# Patient Record
Sex: FEMALE | Race: WHITE | NOT HISPANIC OR LATINO | Employment: OTHER | URBAN - METROPOLITAN AREA
[De-identification: names, ages, dates, MRNs, and addresses within clinical notes are randomized per-mention and may not be internally consistent; named-entity substitution may affect disease eponyms.]

---

## 2017-01-17 ENCOUNTER — HOSPITAL ENCOUNTER (EMERGENCY)
Facility: HOSPITAL | Age: 82
End: 2017-01-17
Attending: EMERGENCY MEDICINE
Payer: MEDICARE

## 2017-01-17 VITALS
RESPIRATION RATE: 16 BRPM | WEIGHT: 112 LBS | TEMPERATURE: 98 F | HEART RATE: 81 BPM | HEIGHT: 61 IN | SYSTOLIC BLOOD PRESSURE: 120 MMHG | BODY MASS INDEX: 21.14 KG/M2 | DIASTOLIC BLOOD PRESSURE: 82 MMHG | OXYGEN SATURATION: 95 %

## 2017-01-17 DIAGNOSIS — M25.551 RIGHT HIP PAIN: ICD-10-CM

## 2017-01-17 PROCEDURE — 25000003 PHARM REV CODE 250: Performed by: EMERGENCY MEDICINE

## 2017-01-17 PROCEDURE — 99284 EMERGENCY DEPT VISIT MOD MDM: CPT

## 2017-01-17 RX ORDER — ACETAMINOPHEN 325 MG/1
650 TABLET ORAL
Status: COMPLETED | OUTPATIENT
Start: 2017-01-17 | End: 2017-01-17

## 2017-01-17 RX ORDER — ESCITALOPRAM OXALATE 10 MG/1
10 TABLET ORAL DAILY
COMMUNITY
End: 2018-02-07 | Stop reason: SDUPTHER

## 2017-01-17 RX ORDER — NITROFURANTOIN MACROCRYSTALS 50 MG/1
50 CAPSULE ORAL NIGHTLY
COMMUNITY
End: 2018-02-07 | Stop reason: SDUPTHER

## 2017-01-17 RX ORDER — DOCUSATE SODIUM 100 MG/1
100 CAPSULE, LIQUID FILLED ORAL 2 TIMES DAILY
COMMUNITY
End: 2018-02-07 | Stop reason: SDUPTHER

## 2017-01-17 RX ORDER — CALCITRIOL 0.25 UG/1
0.25 CAPSULE ORAL DAILY
COMMUNITY
End: 2018-02-07 | Stop reason: SDUPTHER

## 2017-01-17 RX ORDER — OMEPRAZOLE 40 MG/1
40 CAPSULE, DELAYED RELEASE ORAL DAILY
COMMUNITY
End: 2018-02-07 | Stop reason: SDUPTHER

## 2017-01-17 RX ADMIN — ACETAMINOPHEN 650 MG: 325 TABLET, FILM COATED ORAL at 02:01

## 2017-01-17 NOTE — ED NOTES
Per MRI technician, skinny Richard is unable to have MRI of leg due to recent metal placement in neck. Dr. Mclean notified.

## 2017-01-17 NOTE — ED PROVIDER NOTES
Encounter Date: 1/17/2017    SCRIBE #1 NOTE: Sandra GARCIA, ronald scribing for, and in the presence of, Dr. Mclean.       History   No chief complaint on file.    Review of patient's allergies indicates:   Allergen Reactions    Codeine Hives    Penicillins Hives     HPI Comments: 1/17/2017  1:57 PM     Chief Complaint: Fall    The patient is a 82 y.o. female with PMHx of HTN, HLD, stroke, PVD, shingles, lumbar radiculopathy who presents with mechanical fall a few minutes pta. The patient had an witnessed fall at the nursing home while trying to get out of bed at 6 AM. Unknown LOC. Patient denies any headache or changes in neck pain. She c/o sudden onset of sharp pain to the anterior and lateral right hip that has been constant since the fall. Pt does mentions she had pain to the right hip posteriorly prior to the fall and was able to ambulate. However, the patient is not able to move her hip or ambulate. The nursing home was going to do XR at the nursing home but family insisted that she be evaluated at the ED. No other pain or injuries. Patient is at her normal mental baseline. Shx of hysterectomy, appendectomy, bladder suspension, anterior cervical discectomy with fusion.    The history is provided by the patient, the EMS personnel and the nursing home.     Past Medical History   Diagnosis Date    Hearing loss, conductive, bilateral     High cholesterol     History of shingles      vaginal    Hypertension     Kidney artery constriction      has renal artery stent    Lumbar radiculopathy     PONV (postoperative nausea and vomiting)     PVD (peripheral vascular disease)     Stroke      of eye vasculature     No past medical history pertinent negatives.  Past Surgical History   Procedure Laterality Date    Hysterectomy      Appendectomy      Bladder suspension      Anterior cervical discectomy w/ fusion       No family history on file.  Social History   Substance Use Topics    Smoking status:  Former Smoker    Smokeless tobacco: Not on file      Comment: quit 30 yrs ago    Alcohol use No     Review of Systems   Constitutional: Negative for appetite change, chills and fever.        + Fall   HENT: Negative for congestion, rhinorrhea and sore throat.    Respiratory: Negative for cough and shortness of breath.    Cardiovascular: Negative for chest pain.   Gastrointestinal: Negative for abdominal pain, diarrhea, nausea and vomiting.   Genitourinary: Negative for dysuria.   Musculoskeletal: Positive for arthralgias (right hip). Negative for back pain, myalgias and neck pain.   Skin: Negative for rash.   Neurological: Positive for syncope (unknown). Negative for weakness, numbness and headaches.   Hematological: Does not bruise/bleed easily.   All other systems reviewed and are negative.      Physical Exam   Initial Vitals   BP Pulse Resp Temp SpO2   -- -- -- -- --            Physical Exam    Nursing note and vitals reviewed.  Constitutional: No distress.   HENT:   Head: Normocephalic and atraumatic. Head is without abrasion, without contusion and without laceration.   Right Ear: External ear normal. No hemotympanum.   Left Ear: External ear normal. No hemotympanum.   Nose: Nose normal.   Mouth/Throat: Oropharynx is clear and moist and mucous membranes are normal.   Eyes: EOM are normal. Pupils are equal, round, and reactive to light.   Neck:   Soft collar in place to the neck.   Cardiovascular: Normal rate, regular rhythm, normal heart sounds, intact distal pulses and normal pulses. Exam reveals no gallop and no friction rub.    No murmur heard.  Pulses:       Dorsalis pedis pulses are 2+ on the right side, and 2+ on the left side.        Posterior tibial pulses are 2+ on the right side, and 2+ on the left side.   Pulmonary/Chest: Breath sounds normal. She has no wheezes. She has no rhonchi. She has no rales. She exhibits tenderness (left chest wall TTP). She exhibits no crepitus.   Musculoskeletal: She  exhibits tenderness (right lateral and anterior hip TTP). She exhibits no edema.   The patient is able to flex right lower extremity to 45 degrees but is limited secondary to pain. No other leg TTP.   Neurological: She is alert and oriented to person, place, and time.   5/5 strength and sensation to the upper extremities. Cranial nerves are intact.         ED Course   Procedures  Labs Reviewed - No data to display              Imaging Results         CT Lower Extremity Without Cont Right (Final result) Result time:  01/17/17 16:54:01    Final result by Sagar Muñoz MD (01/17/17 16:54:01)    Impression:     Soft tissue contusion along the lateral aspect of the right hip without evidence for fracture.      Electronically signed by: Sagar Muñoz MD  Date:     01/17/17  Time:    16:54     Narrative:    Axial images through the right hip were acquired without contrast with multiplanar reformatted images created.    No comparison    Findings: The bones are osteopenic.  No fracture is identified.  The right femoral head is located.  There is no right hip joint effusion.  Mild soft tissue contusion lateral to the right hip is present.  There is small enthesophyte formation along the greater trochanter.  Mild degenerative change of the right hip and pubic symphysis is noted.            X-Ray Hip 2 or 3 views Right (Final result) Result time:  01/17/17 14:38:41    Final result by Duong Leung Jr., MD (01/17/17 14:38:41)    Impression:     Osteoporosis without a fracture seen.  Atherosclerosis.      Electronically signed by: Duong Leung MD  Date:     01/17/17  Time:    14:38     Narrative:    AP and frog-leg of the right hip and AP of the pelvis is provided.  There is diffuse osteoporosis.  A fracture of the right femur is not seen.  Acetabulum is intact.  On this study fracture the bones of the pelvis is not seen.  There is extensive atherosclerotic calcification in the aorta, pelvic and femoral vessels.             CT Cervical Spine Without Contrast (Final result) Result time:  01/17/17 14:52:05    Final result by Sagar Muñoz MD (01/17/17 14:52:05)    Impression:        1.  No acute cervical spine injury.  2.  Postoperative and degenerative change of the cervical spine as above.      Electronically signed by: Sagar Muñoz MD  Date:     01/17/17  Time:    14:52     Narrative:    CT CERVICAL SPINE WITHOUT CONTRAST    Comparison: none    Technique:  Helically acquired axial images through the cervical spine were acquired without contrast.  Multiplanar reformatted images were provided.    FINDINGS:  There is no fracture or subluxation.  There has been prior anterior cervical discectomy and fusion at the C4-5 level.  There is osseous vertebral body fusion at C5-6 and C6-7.  No malalignment.  Moderate loss of disc height is present at C7-T1.  There is moderate hypertrophic change about the atlantoaxial articulation.    C3-4: Moderate left neuroforaminal narrowing   C4-5: Mild bilateral neuroforaminal narrowing.  C5-6: Mild bilateral neuroforaminal narrowing.  C7-T1: Mild right neuroforaminal narrowing.    The thyroid gland is heterogeneous with multiple small nodules present.  Mild emphysematous changes are seen at the lung apices.            CT Head Without Contrast (Final result) Result time:  01/17/17 14:30:28    Final result by Sagar Muñoz MD (01/17/17 14:30:28)    Impression:         No acute intracranial process.   Moderate chronic ischemic microvascular change.            Electronically signed by: Sagar Muñoz MD  Date:     01/17/17  Time:    14:30     Narrative:    CT HEAD WITHOUT CONTRAST    Technique: 5 mm axial images were obtained from the vertex to the skullbase, without administration of contrast.  Multiplanar reformatted images were created.    Comparison: None.    FINDINGS:    There is no acute intracranial process.  There is no hemorrhage, contusion, or extra-axial collection.   No mass or mass effect.  The ventricles are normal in size given the degree of age-appropriate involutional change. There is moderate periventricular white matter hypoattenuation suggesting chronic ischemic microvascular change.  Heavy calcification of the intracranial ICAs is seen.    The calvarium is intact.            (radiology reading, R hip XR visualized by me)          Scribe Attestation:   Scribe #1: I performed the above scribed service and the documentation accurately describes the services I performed. I attest to the accuracy of the note.    Attending Attestation:           Physician Attestation for Scribe:  Physician Attestation Statement for Scribe #1: I, Dr. Mclean, reviewed documentation, as scribed by Sandra Jaeger in my presence, and it is both accurate and complete.         Jessica Naidu is a 82 y.o. female presenting with right hip pain status post fall.  Patient is unreliable with CT of the head and cervical spine performed showing no acute process.  No sign of disruption of recent cervical fixation.  Based on patient's neck hardware, she is unable to obtain MRI.  X-ray shows no sign of fracture or dislocation with follow-up CT as most advanced available imaging modality possible also showing no fracture or dislocation at the hip.  The patient is distally neurovascularly intact and she notably did have hip pain prior to fall and has excellent range of motion here on serial reassessment with inability to fully range hip to 90° in flexion without pain.  She is appropriate for discharge back to nursing facility rehabilitation as tolerated by current functional status.  Close orthopedic follow-up was .  Detailed return precautions reviewed.        ED Course     Clinical Impression:   The encounter diagnosis was Right hip pain.          Jeffery Mclean MD  01/17/17 1912

## 2017-01-17 NOTE — ED NOTES
[facility] given to Karina at [facility]. [facility] Was told that they will be here shortly. Pt and daughter notified.

## 2017-01-17 NOTE — ED AVS SNAPSHOT
[facility] [address] [place] [address]               Jessica Naidu   2017  1:55 PM   ED    Description:  Female : 1934   Department:  [facility]           Your Care was Coordinated By:     Provider Role From To    Jeffery Mclean MD Attending Provider 17 0830 --      Reason for Visit     Fall           Diagnoses this Visit        Comments    Right hip pain           ED Disposition     ED Disposition Condition Comment    Discharge             To Do List           Follow-up Information     Follow up with Marcos Miller MD.    Specialties:  Sports Medicine, Orthopedic Surgery    Why:  Orthopedics, 3-5 days    Contact information:    [address] [place] [address] [address] [address]  [phone]        [facility] On [facility]     [facility] -  Assistance  Registered nurses in the [facility] provide clinical advisement, health education, appointment booking, and other advisory services.  Call for this free service at [phone].             Medications           Message regarding Medications     Verify the changes and/or additions to your medication regime listed below are the same as discussed with your clinician today.  If any of these changes or additions are incorrect, please notify your healthcare provider.        These medications were administered today        Dose Freq    acetaminophen tablet 650 mg 650 mg ED 1 Time    Sig: Take 2 tablets (650 mg total) by mouth ED 1 Time.    Class: Normal    Route: Oral      STOP taking these medications     aspirin (ECOTRIN) 81 MG EC tablet Take 81 mg by mouth once daily.      gabapentin (NEURONTIN) 300 MG capsule Take 300 mg by mouth every evening.      rosuvastatin (CRESTOR) 10 MG tablet Take 10 mg by mouth once daily.      spironolactone (ALDACTONE) 100 MG tablet Take 100 mg by mouth once daily.             Verify that the below list of medications  "is an accurate representation of the medications you are currently taking.  If none reported, the list may be blank. If incorrect, please contact your healthcare provider. Carry this list with you in case of emergency.           Current Medications     brimonidine 0.1% (ALPHAGAN P) 0.1 % Drop Place 1 drop into both eyes 3 (three) times daily.      calcitRIOL (ROCALTROL) 0.25 MCG Cap Take 0.25 mcg by mouth once daily.    cilostazol (PLETAL) 50 MG Tab Take 100 mg by mouth 2 (two) times daily.      diltiazem (CARDIZEM CD) 360 MG 24 hr capsule Take 360 mg by mouth once daily.     docusate sodium (COLACE) 100 MG capsule Take 100 mg by mouth 2 (two) times daily.    escitalopram oxalate (LEXAPRO) 10 MG tablet Take 10 mg by mouth once daily.    nitrofurantoin (MACRODANTIN) 50 MG capsule Take 50 mg by mouth every evening.    omeprazole (PRILOSEC) 40 MG capsule Take 40 mg by mouth once daily.           Clinical Reference Information           Your Vitals Were     BP Pulse Temp Resp Height Weight    131/70 82 98.4 °F (36.9 °C) (Oral) 18 5' 1" (1.549 m) 50.8 kg (112 lb)    SpO2 BMI             96% 21.16 kg/m2         Allergies as of 1/17/2017        Reactions    Codeine Hives    Penicillins Hives      Immunizations Administered on Date of Encounter - 1/17/2017     None      ED Micro, Lab, POCT     None      ED Imaging Orders     Start Ordered       Status Ordering Provider    01/17/17 1550 01/17/17 1549  CT Lower Extremity Without Cont Right  1 time imaging      Final result     01/17/17 1443 01/17/17 1443    1 time imaging,   Status:  Canceled      Canceled     01/17/17 1406 01/17/17 1406  CT Head Without Contrast  1 time imaging      Final result     01/17/17 1406 01/17/17 1406  CT Cervical Spine Without Contrast  1 time imaging      Final result     01/17/17 1406 01/17/17 1406  X-Ray Hip 2 or 3 views Right  1 time imaging      Final result       Discharge References/Attachments     HIP, HOW IT WORKS (ENGLISH)    ARTHRALGIA " (ENGLISH)      [facility] Sign-Up     Activating your [facility] account is as easy as 1-2-3!     1) Visit [email or web address], select Sign Up Now, enter this activation code and your date of birth, then select Next.  [email or web address]  Expires: 3/3/2017  5:19 PM      2) Create a username and password to use when you visit [facility] in the future and select a security question in case you lose your password and select Next.    3) Enter your e-mail address and click Sign Up!    Additional Information  If you have questions, please e-mail [email or web address] or call [phone] to talk to our [facility] staff. Remember, [facility] is NOT to be used for urgent needs. For medical emergencies, dial 911.         Smoking Cessation     If you would like to quit smoking:   You may be eligible for free services if you are a [place] resident and started smoking cigarettes before September 1, 1988.  Call the Smoking Cessation Trust (SCT) toll free at (819) 714-9221 or (863) 684-1843.   Call -549-QUIT-NOW if you do not meet the above criteria.             [facility] complies with applicable Federal civil rights laws and does not discriminate on the basis of race, color, national origin, age, disability, or sex.        Language Assistance Services     ATTENTION: Language assistance services are available, free of charge. Please call [phone].      ATENCIÓN: Si habla español, tiene a branch disposición servicios gratuitos de asistencia lingüística. Llame al [phone].     CHÚ Ý: N?u b?n nói Ti?ng Vi?t, có các d?ch v? h? tr? ngôn ng? mi?n phí dành cho b?n. G?i s? [phone].

## 2017-01-18 NOTE — ED NOTES
Pt presents to ED with c/o pain to right hip. Pt fell at 0645 this morning. Pt denies LOC. Pt reports that she hit her left breast and right hip. Bruising noted to left breast. Pt is AAOx4. Skin warm, dry to touch. Respirations even, nonlabored. NAD noted. VSS. Pt is calm, cooperative. Pt has bandage to right hip from hip surgery one week ago. Pt reports that she had a piece of bone removed from her right hip and placed in her neck. Pt has soft c-collor in place at this time. Pt denies new back or neck pain. Pt has full ROM of upper extremities and left lower extremity. Limited ROM noted to RLE due to pain.

## 2017-01-19 ENCOUNTER — TELEPHONE (OUTPATIENT)
Dept: ORTHOPEDICS | Facility: CLINIC | Age: 82
End: 2017-01-19

## 2017-01-19 NOTE — TELEPHONE ENCOUNTER
----- Message from Kayley Burrell LPN sent at 1/19/2017  1:43 PM CST -----  Contact: Darlene  Requesting appt with Tia on 1/25.  ----- Message -----     From: Karen Greene     Sent: 1/19/2017   1:07 PM       To: Paul Hernandez Staff    [facility] Dr. Miller. Stating ER f/u in 2/3 days. No availabilities until 1/30 and wanted to make sure was ok to schedule with Dr. Weber for 1/25. Please contact Darlene [phone]

## 2017-01-19 NOTE — TELEPHONE ENCOUNTER
Attempted to contact [facility] - phone was answered and  was going to speak with family if ok to schedule with other provider. Phone was picked back up then disconnected.

## 2018-02-07 DIAGNOSIS — K21.9 GASTROESOPHAGEAL REFLUX DISEASE, ESOPHAGITIS PRESENCE NOT SPECIFIED: Primary | ICD-10-CM

## 2018-02-07 DIAGNOSIS — F41.9 ANXIETY: ICD-10-CM

## 2018-02-07 RX ORDER — CILOSTAZOL 50 MG/1
100 TABLET ORAL 2 TIMES DAILY
Qty: 360 TABLET | Refills: 1 | Status: SHIPPED | OUTPATIENT
Start: 2018-02-07 | End: 2018-11-26 | Stop reason: SDUPTHER

## 2018-02-07 RX ORDER — CALCITRIOL 0.25 UG/1
0.25 CAPSULE ORAL DAILY
Qty: 90 CAPSULE | Refills: 1 | Status: SHIPPED | OUTPATIENT
Start: 2018-02-07 | End: 2018-11-26 | Stop reason: SDUPTHER

## 2018-02-07 RX ORDER — NITROFURANTOIN MACROCRYSTALS 50 MG/1
50 CAPSULE ORAL NIGHTLY
Qty: 90 CAPSULE | Refills: 1 | Status: SHIPPED | OUTPATIENT
Start: 2018-02-07 | End: 2018-11-26

## 2018-02-07 RX ORDER — DILTIAZEM HYDROCHLORIDE 360 MG/1
360 CAPSULE, EXTENDED RELEASE ORAL DAILY
Qty: 90 CAPSULE | Refills: 1 | Status: SHIPPED | OUTPATIENT
Start: 2018-02-07 | End: 2018-11-26 | Stop reason: SDUPTHER

## 2018-02-07 RX ORDER — ESCITALOPRAM OXALATE 10 MG/1
10 TABLET ORAL DAILY
Qty: 90 TABLET | Refills: 1 | Status: SHIPPED | OUTPATIENT
Start: 2018-02-07 | End: 2018-11-26

## 2018-02-07 RX ORDER — DOCUSATE SODIUM 100 MG/1
100 CAPSULE, LIQUID FILLED ORAL 2 TIMES DAILY
COMMUNITY
Start: 2018-02-07 | End: 2018-11-26

## 2018-02-07 RX ORDER — OMEPRAZOLE 40 MG/1
40 CAPSULE, DELAYED RELEASE ORAL DAILY
Qty: 90 CAPSULE | Refills: 1 | Status: SHIPPED | OUTPATIENT
Start: 2018-02-07 | End: 2018-11-26 | Stop reason: SDUPTHER

## 2018-02-07 NOTE — TELEPHONE ENCOUNTER
----- Message from Cassy Bolaños sent at 2/7/2018  9:42 AM CST -----  Contact: self  Pharmacy has been sending requests for refill of her meds.  Please send refills for all her meds to [facility].

## 2018-03-28 ENCOUNTER — TELEPHONE (OUTPATIENT)
Dept: INTERNAL MEDICINE | Facility: CLINIC | Age: 83
End: 2018-03-28

## 2018-03-28 RX ORDER — MEMANTINE HYDROCHLORIDE 10 MG/1
10 TABLET ORAL 2 TIMES DAILY
Qty: 90 TABLET | Refills: 1 | Status: SHIPPED | OUTPATIENT
Start: 2018-03-28 | End: 2018-11-26 | Stop reason: SDUPTHER

## 2018-03-28 NOTE — TELEPHONE ENCOUNTER
Not-Taking   Memantine HCl 10 MG Tablet 1 tablet Orally Twice a day      From patient's last visit summary.  Please advise.

## 2018-03-28 NOTE — TELEPHONE ENCOUNTER
----- Message from Cassy Bolaños sent at 3/27/2018  9:06 AM CDT -----  Contact: daughter-radha Madison is out of [place].  Please escribe to [facility].

## 2018-05-01 ENCOUNTER — TELEPHONE (OUTPATIENT)
Dept: INTERNAL MEDICINE | Facility: CLINIC | Age: 83
End: 2018-05-01

## 2018-05-01 NOTE — TELEPHONE ENCOUNTER
Dr. Calzada, It looks like she has been on Macrodantin (Nitrofurantoin)  Fairly continuously since 2014.  Can you please advise for the prior authorization?

## 2018-05-01 NOTE — TELEPHONE ENCOUNTER
Marcia Calzada MD   You 35 minutes ago (4:51 PM)      Yes for chronic uti prevention (Routing comment)        The following medication needs a prior authorization:     Medication Name: macrodantin     Dosage: 50mg    Frequency: qhs    Directions for use: take 1 at bedtime    Diagnosis: N39.0    Is the request for a reauthorization? Yes    Is the patient currently stable on therapy? Yes    Please list all therapeutic alternatives previously used with start/end dates and outcome:    Surgeries for bladder lifts    Multiple lincomycin antibiotic injections in-office to treat UTI's  Ciprofloxacin (multiple rounds)  Doxycycline (multiple rounds)  Allergic to Bactrim (sulfa class antibiotics)  Allergic to Penicillin class antibiotics    Thank you.

## 2018-05-01 NOTE — TELEPHONE ENCOUNTER
----- Message from Charissa Storm sent at 5/1/2018  8:58 AM CDT -----  PRIOR AUTHORIZATION, 04/30/2018

## 2018-05-03 ENCOUNTER — TELEPHONE (OUTPATIENT)
Dept: INTERNAL MEDICINE | Facility: CLINIC | Age: 83
End: 2018-05-03
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
## 2018-05-17 ENCOUNTER — TELEPHONE (OUTPATIENT)
Dept: INTERNAL MEDICINE | Facility: CLINIC | Age: 83
End: 2018-05-17
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
## 2018-05-17 NOTE — TELEPHONE ENCOUNTER
WE TOLD THE PATIENTS DAUGHTER SINCE SHE NO LONGER LIVES HERE THAT SHE NEEDED TO FIND A NEW PCP. WE NO LONGER SEE HER

## 2018-11-20 ENCOUNTER — OFFICE VISIT (OUTPATIENT)
Dept: UROGYNECOLOGY | Facility: CLINIC | Age: 83
End: 2018-11-20
Payer: MEDICARE

## 2018-11-20 VITALS
DIASTOLIC BLOOD PRESSURE: 75 MMHG | SYSTOLIC BLOOD PRESSURE: 155 MMHG | WEIGHT: 110 LBS | BODY MASS INDEX: 20.24 KG/M2 | HEIGHT: 62 IN | HEART RATE: 78 BPM

## 2018-11-20 DIAGNOSIS — N39.42 URINARY INCONTINENCE WITHOUT SENSORY AWARENESS: ICD-10-CM

## 2018-11-20 DIAGNOSIS — N39.0 RECURRENT UTI: ICD-10-CM

## 2018-11-20 DIAGNOSIS — R35.0 URINARY FREQUENCY: Primary | ICD-10-CM

## 2018-11-20 LAB
BILIRUB SERPL-MCNC: ABNORMAL MG/DL
BLOOD URINE, POC: ABNORMAL
COLOR, POC UA: ABNORMAL
GLUCOSE UR QL STRIP: ABNORMAL
KETONES UR QL STRIP: ABNORMAL
LEUKOCYTE ESTERASE URINE, POC: ABNORMAL
NITRITE, POC UA: POSITIVE
PH, POC UA: 6
PROTEIN, POC: ABNORMAL
SPECIFIC GRAVITY, POC UA: 1.01
UROBILINOGEN, POC UA: ABNORMAL

## 2018-11-20 PROCEDURE — 81002 URINALYSIS NONAUTO W/O SCOPE: CPT | Mod: S$GLB,,, | Performed by: OBSTETRICS & GYNECOLOGY

## 2018-11-20 PROCEDURE — 87086 URINE CULTURE/COLONY COUNT: CPT

## 2018-11-20 PROCEDURE — 87088 URINE BACTERIA CULTURE: CPT

## 2018-11-20 PROCEDURE — 1101F PT FALLS ASSESS-DOCD LE1/YR: CPT | Mod: CPTII,S$GLB,, | Performed by: OBSTETRICS & GYNECOLOGY

## 2018-11-20 PROCEDURE — 87077 CULTURE AEROBIC IDENTIFY: CPT

## 2018-11-20 PROCEDURE — 99203 OFFICE O/P NEW LOW 30 MIN: CPT | Mod: 25,S$GLB,, | Performed by: OBSTETRICS & GYNECOLOGY

## 2018-11-20 PROCEDURE — 87186 SC STD MICRODIL/AGAR DIL: CPT

## 2018-11-20 PROCEDURE — 99999 PR PBB SHADOW E&M-EST. PATIENT-LVL III: CPT | Mod: PBBFAC,,, | Performed by: OBSTETRICS & GYNECOLOGY

## 2018-11-20 RX ORDER — TRIMETHOPRIM 100 MG/1
100 TABLET ORAL NIGHTLY
Qty: 42 TABLET | Refills: 6 | Status: SHIPPED | OUTPATIENT
Start: 2018-11-20 | End: 2019-07-31 | Stop reason: SDUPTHER

## 2018-11-20 NOTE — PROGRESS NOTES
Subjective:     Chief Complaint:  Leakage of urine  History of Present Illness: Jessica Naidu is a 84 y.o. female who presents for insensitive incontinence.  She denies any symptoms but her daughter says when she walks down the rocha urine dribbles.  Patient denies any dysuria but her urinalysis today is normal.  She has been on Macrobid for prophylaxis.  She does have some decreased renal function.  Her hearing is very poor so communication is difficult in her daughter had to answer most of the questions.  She does have IBS and has had intermittent bouts of diarrhea and constipation and had a recent fecal impaction    Review of Systems    Constitutional: No acute distress. No weight gain/loss.  Eyes: No vision changes.  ENT:  Hearing aids but they do not work very well  Respiratory: No SOB.  Cardiovascular: PVD  Gastrointestinal:  fecal incontinence.   Genitourinary: No vaginal bleeding or discharge.  Integument/Breast:  Shingles  Hematologic/Lymphatic: No history of anemia.  Musculoskeletal: OA  Neurological:  Stroke  Behavioral/Psych: No history of depression.   Endocrine: No hormonal replacement.  Allergy/Immune: no recent reactions     Objective:   General Exam:  General appearance: WDNF. NAD.   HEENT:  Decreased auditory acuity  Neck: Normal thyroid.   Back: No CVA tenderness.  RESP: No SOB.  Breasts: deferred  Abdomen: Benign without localizing signs.  Extremities: No edema. No varices.  Lymphatic: noncontributory  Skin: No rashes. No lesions.  Neurologic: Intact.   Psych: Oriented.   Pelvic Exam:  V:  No lesions. No palpable nodes.   Va:No d/c bleeding or lesions.  Atrophy.  Good apical support and length.  Minimal apical anterior weakness  .Meatus:No caruncle or stenosis  Urethra: Non tender. No suburethral masses.  Some loss of urethrovesical junction support  Cx/Cuff: Normal   Uterus: Surgically absent.  Ad: No mass or tenderness.  Levators :Symmetrical. Normal tone. Non tender.  BL: Non tender  RV:   Much stool in rectum  Assessment:   OAB versus ISD-patient unable to give us an accurate history of her symptoms   Recurrent UTIs, symptomatic lead doing well with prophylaxis but her urinalysis today is abnormal    Plan:    Trial of Myrbetriq-if she does well we will prescribe it if not, I discussed with the daughter possibility of a bulking injection for ISD but I doubt will do that since the patient is not bothered by the leakage  Urine culture  Change prophylaxis to trimethoprim

## 2018-11-24 LAB — BACTERIA UR CULT: NORMAL

## 2018-11-26 ENCOUNTER — OFFICE VISIT (OUTPATIENT)
Dept: FAMILY MEDICINE | Facility: CLINIC | Age: 83
End: 2018-11-26
Payer: MEDICARE

## 2018-11-26 VITALS
OXYGEN SATURATION: 97 % | WEIGHT: 112 LBS | SYSTOLIC BLOOD PRESSURE: 126 MMHG | RESPIRATION RATE: 14 BRPM | HEART RATE: 72 BPM | DIASTOLIC BLOOD PRESSURE: 78 MMHG | HEIGHT: 62 IN | TEMPERATURE: 98 F | BODY MASS INDEX: 20.61 KG/M2

## 2018-11-26 DIAGNOSIS — M81.0 AGE-RELATED OSTEOPOROSIS WITHOUT CURRENT PATHOLOGICAL FRACTURE: ICD-10-CM

## 2018-11-26 DIAGNOSIS — R41.3 MEMORY LOSS: ICD-10-CM

## 2018-11-26 DIAGNOSIS — K21.9 GASTROESOPHAGEAL REFLUX DISEASE, ESOPHAGITIS PRESENCE NOT SPECIFIED: Primary | ICD-10-CM

## 2018-11-26 DIAGNOSIS — I10 ESSENTIAL HYPERTENSION: ICD-10-CM

## 2018-11-26 DIAGNOSIS — E78.00 PURE HYPERCHOLESTEROLEMIA: ICD-10-CM

## 2018-11-26 DIAGNOSIS — F90.2 ATTENTION DEFICIT HYPERACTIVITY DISORDER (ADHD), COMBINED TYPE: ICD-10-CM

## 2018-11-26 DIAGNOSIS — I70.1 RAS (RENAL ARTERY STENOSIS): ICD-10-CM

## 2018-11-26 PROBLEM — M25.511 CHRONIC PAIN OF BOTH SHOULDERS: Status: ACTIVE | Noted: 2018-11-26

## 2018-11-26 PROBLEM — M50.30 DEGENERATIVE DISC DISEASE, CERVICAL: Status: ACTIVE | Noted: 2018-11-26

## 2018-11-26 PROBLEM — M25.552 PAIN OF BOTH HIP JOINTS: Status: ACTIVE | Noted: 2018-11-26

## 2018-11-26 PROBLEM — M25.551 PAIN OF BOTH HIP JOINTS: Status: ACTIVE | Noted: 2018-11-26

## 2018-11-26 PROBLEM — M51.36 DEGENERATION OF INTERVERTEBRAL DISC OF LUMBAR REGION: Status: ACTIVE | Noted: 2018-11-26

## 2018-11-26 PROBLEM — M25.512 CHRONIC PAIN OF BOTH SHOULDERS: Status: ACTIVE | Noted: 2018-11-26

## 2018-11-26 PROBLEM — G89.29 CHRONIC PAIN OF BOTH SHOULDERS: Status: ACTIVE | Noted: 2018-11-26

## 2018-11-26 PROCEDURE — 1101F PT FALLS ASSESS-DOCD LE1/YR: CPT | Mod: ,,, | Performed by: INTERNAL MEDICINE

## 2018-11-26 PROCEDURE — 99214 OFFICE O/P EST MOD 30 MIN: CPT | Mod: ,,, | Performed by: INTERNAL MEDICINE

## 2018-11-26 RX ORDER — DILTIAZEM HYDROCHLORIDE 360 MG/1
360 CAPSULE, EXTENDED RELEASE ORAL DAILY
Qty: 90 CAPSULE | Refills: 1 | Status: SHIPPED | OUTPATIENT
Start: 2018-11-26 | End: 2019-04-22 | Stop reason: SDUPTHER

## 2018-11-26 RX ORDER — MEMANTINE HYDROCHLORIDE 10 MG/1
10 TABLET ORAL 2 TIMES DAILY
Qty: 90 TABLET | Refills: 1 | Status: SHIPPED | OUTPATIENT
Start: 2018-11-26 | End: 2019-04-30 | Stop reason: SDUPTHER

## 2018-11-26 RX ORDER — CIPROFLOXACIN 500 MG/1
500 TABLET ORAL 2 TIMES DAILY
Qty: 10 TABLET | Refills: 1 | Status: SHIPPED | OUTPATIENT
Start: 2018-11-26 | End: 2019-01-27

## 2018-11-26 RX ORDER — HYDROCODONE BITARTRATE AND ACETAMINOPHEN 7.5; 325 MG/15ML; MG/15ML
SOLUTION ORAL
Refills: 0 | COMMUNITY
Start: 2018-11-07 | End: 2019-04-30

## 2018-11-26 RX ORDER — ATORVASTATIN CALCIUM 10 MG/1
10 TABLET, FILM COATED ORAL DAILY
Qty: 90 TABLET | Refills: 1 | Status: SHIPPED | OUTPATIENT
Start: 2018-11-26 | End: 2019-04-30 | Stop reason: SDUPTHER

## 2018-11-26 RX ORDER — CILOSTAZOL 50 MG/1
100 TABLET ORAL 2 TIMES DAILY
Qty: 360 TABLET | Refills: 1 | Status: SHIPPED | OUTPATIENT
Start: 2018-11-26 | End: 2019-04-22 | Stop reason: SDUPTHER

## 2018-11-26 RX ORDER — DONEPEZIL HYDROCHLORIDE 10 MG/1
TABLET, FILM COATED ORAL
COMMUNITY
Start: 2018-11-12 | End: 2018-11-26 | Stop reason: SDUPTHER

## 2018-11-26 RX ORDER — ALENDRONATE SODIUM 70 MG/1
70 TABLET ORAL
Qty: 12 TABLET | Refills: 1 | Status: SHIPPED | OUTPATIENT
Start: 2018-11-26 | End: 2019-04-30 | Stop reason: SDUPTHER

## 2018-11-26 RX ORDER — CALCITRIOL 0.25 UG/1
0.25 CAPSULE ORAL DAILY
Qty: 90 CAPSULE | Refills: 1 | Status: SHIPPED | OUTPATIENT
Start: 2018-11-26 | End: 2019-04-22 | Stop reason: SDUPTHER

## 2018-11-26 RX ORDER — DONEPEZIL HYDROCHLORIDE 10 MG/1
10 TABLET, FILM COATED ORAL DAILY
Qty: 90 TABLET | Refills: 1 | Status: SHIPPED | OUTPATIENT
Start: 2018-11-26 | End: 2019-04-30 | Stop reason: SDUPTHER

## 2018-11-26 RX ORDER — ATOMOXETINE 10 MG/1
10 CAPSULE ORAL DAILY
Qty: 30 CAPSULE | Refills: 0 | Status: SHIPPED | OUTPATIENT
Start: 2018-11-26 | End: 2019-08-13

## 2018-11-26 RX ORDER — OMEPRAZOLE 40 MG/1
40 CAPSULE, DELAYED RELEASE ORAL DAILY
Qty: 90 CAPSULE | Refills: 1 | Status: SHIPPED | OUTPATIENT
Start: 2018-11-26 | End: 2019-04-30 | Stop reason: SDUPTHER

## 2018-11-26 RX ORDER — IPRATROPIUM BROMIDE 0.5 MG/2.5ML
SOLUTION RESPIRATORY (INHALATION)
COMMUNITY
End: 2019-04-30

## 2018-11-26 RX ORDER — ALENDRONATE SODIUM 70 MG/1
TABLET ORAL
COMMUNITY
Start: 2018-10-01 | End: 2018-11-26 | Stop reason: SDUPTHER

## 2018-11-26 RX ORDER — OFLOXACIN 3 MG/ML
SOLUTION/ DROPS OPHTHALMIC
COMMUNITY
Start: 2018-11-19 | End: 2020-01-04 | Stop reason: ALTCHOICE

## 2018-11-26 RX ORDER — HYDROCODONE BITARTRATE AND ACETAMINOPHEN 7.5; 325 MG/15ML; MG/15ML
SOLUTION ORAL
Qty: 118 ML | Refills: 0 | Status: CANCELLED | OUTPATIENT
Start: 2018-11-26

## 2018-11-26 RX ORDER — ATORVASTATIN CALCIUM 10 MG/1
TABLET, FILM COATED ORAL
COMMUNITY
Start: 2018-11-12 | End: 2018-11-26 | Stop reason: SDUPTHER

## 2018-11-26 NOTE — PROGRESS NOTES
SUBJECTIVE:    Patient ID: Jessica Naidu is a 84 y.o. female.    Chief Complaint: Hypertension and Follow-up    HPI     Pt is back to [id number] saw Neuro --dx Mild Alzheimers-Saw Dr Beltran and all doing well--she does not have much of an appetite-eats very little but more times a day-saw Dr Buckley for pain in the low back and shoulder (right)-also saw ortho and got MRI which revealed the need for a surgery but she is not considered a good surgical candidate--Her pain management is Ion Buckley.     Memory is a real issue-    Office Visit on 11/20/2018   Component Date Value Ref Range Status    Color, UA 11/20/2018 dark yellow   Final    Spec Grav UA 11/20/2018 1.015   Final    pH, UA 11/20/2018 6   Final    WBC, UA 11/20/2018 trace   Final    Nitrite, UA 11/20/2018 positive   Final    Protein 11/20/2018 1+   Final    Glucose, UA 11/20/2018 norm   Final    Ketones, UA 11/20/2018 neg   Final    Urobilinogen, UA 11/20/2018 norm   Final    Bilirubin 11/20/2018 neg   Final    Blood, UA 11/20/2018 trace   Final    Urine Culture, Routine 11/20/2018    Final                    Value:PSEUDOMONAS AERUGINOSA  >100,000 cfu/ml         Past Medical History:   Diagnosis Date    Hearing loss, conductive, bilateral     High cholesterol     History of shingles     vaginal    Hypertension     Kidney artery constriction     has renal artery stent    Lumbar radiculopathy     PONV (postoperative nausea and vomiting)     PVD (peripheral vascular disease)     Stroke     of eye vasculature     Social History     Socioeconomic History    Marital status: Single     Spouse name: Not on file    Number of children: Not on file    Years of education: Not on file    Highest education level: Not on file   Social Needs    Financial resource strain: Not on file    Food insecurity - worry: Not on file    Food insecurity - inability: Not on file    Transportation needs - medical: Not on file    Transportation  "needs - non-medical: Not on file   Occupational History    Not on file   Tobacco Use    Smoking status: Former Smoker     Packs/day: 1.00     Types: Cigarettes    Smokeless tobacco: Never Used    Tobacco comment: quit 30 yrs ago   Substance and Sexual Activity    Alcohol use: No    Drug use: No    Sexual activity: Not on file   Other Topics Concern    Not on file   Social History Narrative    Not on file     Past Surgical History:   Procedure Laterality Date    ANTERIOR CERVICAL DISCECTOMY W/ FUSION      APPENDECTOMY      BLADDER SUSPENSION      BLOCK, NERVE, FACET JOINT, LUMBAR, MEDIAL BRANCH Right 10/25/2012    [facility]    HYSTERECTOMY      RADIOFREQUENCY ABLATION, NERVE, SPINAL, LUMBAR, MEDIAL BRANCH, 1 LEVEL Right 11/8/2012    [facility]     History reviewed. No pertinent family history.  Vitals:    11/26/18 1539   BP: 126/78   Pulse: 72   Resp: 14   Temp: 97.9 °F (36.6 °C)   SpO2: 97%   Weight: 50.8 kg (112 lb)   Height: 5' 2" (1.575 m)       Review of Systems   Constitutional: Negative for appetite change, chills, diaphoresis, fatigue, fever and unexpected weight change.        Eats a lot of sugar-daughter is not impressed with the amount of food she eats   HENT: Negative for congestion, ear pain, hearing loss, nosebleeds, postnasal drip, sinus pressure, sinus pain, sneezing, sore throat, tinnitus, trouble swallowing and voice change.         Chronic rhinorrhea-occasionally she feel like food will not pass thru the esophagus several times a day-more like food gets clogged and she clears throat but cannot talk due to same   Eyes: Negative for photophobia, pain, itching and visual disturbance.   Respiratory: Negative for apnea, cough (chronic cough), chest tightness, shortness of breath, wheezing and stridor.         Some interstitial disease but she is not sx   Cardiovascular: Negative for chest pain, palpitations and leg " swelling.   Gastrointestinal: Negative for abdominal distention, abdominal pain, blood in stool, constipation, diarrhea, nausea and vomiting.   Endocrine: Negative for cold intolerance, heat intolerance, polydipsia and polyuria.   Genitourinary: Negative for difficulty urinating, dyspareunia, dysuria, flank pain, frequency (with some incontinence), hematuria, menstrual problem, pelvic pain, urgency, vaginal discharge and vaginal pain.   Musculoskeletal: Negative for arthralgias, back pain, joint swelling, myalgias, neck pain and neck stiffness.        HPI   Skin: Negative for pallor.        bruises easily   Allergic/Immunologic: Negative for environmental allergies and food allergies.   Neurological: Negative for dizziness, tremors, speech difficulty, weakness, light-headedness and numbness.        Hard of hearing-wears hearing aides   Hematological: Does not bruise/bleed easily (chronic).   Psychiatric/Behavioral: Negative for agitation, confusion (dementia beginning), decreased concentration (hyper all the time), sleep disturbance and suicidal ideas. The patient is not nervous/anxious.           Objective:      Physical Exam   Constitutional: She is oriented to person, place, and time. She appears well-developed and well-nourished. She is cooperative. No distress.   HENT:   Head: Normocephalic and atraumatic.   Right Ear: Tympanic membrane normal.   Left Ear: Tympanic membrane normal.   Mouth/Throat: Uvula is midline and mucous membranes are normal.   Clearing throat throughout the exam   Eyes: Conjunctivae and EOM are normal. Right pupil is round and reactive. Left pupil is round and reactive.   Neck: Trachea normal and normal range of motion. Neck supple. No JVD present. No thyromegaly present.   Cardiovascular: Normal rate, regular rhythm and intact distal pulses.   Murmur (2/6 BOLIVAR base) heard.  Pulmonary/Chest: Effort normal and breath sounds normal. No tachypnea. No respiratory distress.   Abdominal: Soft.  Bowel sounds are normal. There is no tenderness.   Musculoskeletal: Normal range of motion.   Crepitus  shoulders   Lymphadenopathy:     She has no cervical adenopathy.   Neurological: She is alert and oriented to person, place, and time. She has normal strength.   Short term memory loss   Skin: Skin is warm and dry. No rash noted.   Psychiatric: Her speech is normal.   Very hyperactive-   Nursing note and vitals reviewed.          Assessment:       1. Gastroesophageal reflux disease, esophagitis presence not specified    2. Memory loss    3. Attention deficit hyperactivity disorder (ADHD), combined type    4. Age-related osteoporosis without current pathological fracture    5. CHASTITY (renal artery stenosis)    6. Essential hypertension    7. Pure hypercholesterolemia         Plan:           Gastroesophageal reflux disease, esophagitis presence not specified  -     omeprazole (PRILOSEC) 40 MG capsule; Take 1 capsule (40 mg total) by mouth once daily.  Dispense: 90 capsule; Refill: 1  -     Comprehensive metabolic panel; Future; Expected date: 11/26/2018    Memory loss  -     memantine (NAMENDA) 10 MG Tab; Take 1 tablet (10 mg total) by mouth 2 (two) times daily.  Dispense: 90 tablet; Refill: 1  -     donepezil (ARICEPT) 10 MG tablet; Take 1 tablet (10 mg total) by mouth once daily.  Dispense: 90 tablet; Refill: 1  -     cilostazol (PLETAL) 50 MG Tab; Take 2 tablets (100 mg total) by mouth 2 (two) times daily.  Dispense: 360 tablet; Refill: 1    Attention deficit hyperactivity disorder (ADHD), combined type        -     Try very low dose of strattera 10 mg    Age-related osteoporosis without current pathological fracture  -     calcitRIOL (ROCALTROL) 0.25 MCG Cap; Take 1 capsule (0.25 mcg total) by mouth once daily.  Dispense: 90 capsule; Refill: 1  -     alendronate (FOSAMAX) 70 MG tablet; Take 1 tablet (70 mg total) by mouth every 7 days.  Dispense: 12 tablet; Refill: 1  -     Vitamin D; Future; Expected date:  11/26/2018    CHASTITY (renal artery stenosis)  -     cilostazol (PLETAL) 50 MG Tab; Take 2 tablets (100 mg total) by mouth 2 (two) times daily.  Dispense: 360 tablet; Refill: 1  -     Lipid panel; Future; Expected date: 11/26/2018    Essential hypertension  -     diltiaZEM (CARDIZEM CD) 360 MG 24 hr capsule; Take 1 capsule (360 mg total) by mouth once daily.  Dispense: 90 capsule; Refill: 1  -     cilostazol (PLETAL) 50 MG Tab; Take 2 tablets (100 mg total) by mouth 2 (two) times daily.  Dispense: 360 tablet; Refill: 1  -     CBC auto differential; Future; Expected date: 11/26/2018  -     Comprehensive metabolic panel; Future; Expected date: 11/26/2018  -     Lipid panel; Future; Expected date: 11/26/2018    Pure hypercholesterolemia  -     atorvastatin (LIPITOR) 10 MG tablet; Take 1 tablet (10 mg total) by mouth once daily.  Dispense: 90 tablet; Refill: 1  -     Comprehensive metabolic panel; Future; Expected date: 11/26/2018  -     Lipid panel; Future; Expected date: 11/26/2018    Other orders  -     Cancel: hydrocodone-acetaminophen (HYCET) solution 7.5-325 mg/15mL; TK 5 ML PO ONCE A DAY PRN  Dispense: 118 mL; Refill: 0  -     atomoxetine (STRATTERA) 10 MG capsule; Take 1 capsule (10 mg total) by mouth once daily.  Dispense: 30 capsule; Refill: 0

## 2018-12-29 DIAGNOSIS — G47.30 SLEEP APNEA, UNSPECIFIED TYPE: Primary | ICD-10-CM

## 2019-01-27 ENCOUNTER — TELEPHONE (OUTPATIENT)
Dept: FAMILY MEDICINE | Facility: CLINIC | Age: 84
End: 2019-01-27

## 2019-01-27 DIAGNOSIS — J40 BRONCHITIS: Primary | ICD-10-CM

## 2019-01-27 RX ORDER — BENZONATATE 100 MG/1
100 CAPSULE ORAL 3 TIMES DAILY PRN
Qty: 30 CAPSULE | Refills: 0 | Status: SHIPPED | OUTPATIENT
Start: 2019-01-27 | End: 2019-01-30

## 2019-01-27 RX ORDER — AZITHROMYCIN 200 MG/5ML
5 POWDER, FOR SUSPENSION ORAL DAILY
Qty: 30 ML | Refills: 0 | Status: SHIPPED | OUTPATIENT
Start: 2019-01-27 | End: 2019-02-01

## 2019-01-30 ENCOUNTER — OFFICE VISIT (OUTPATIENT)
Dept: GASTROENTEROLOGY | Facility: CLINIC | Age: 84
End: 2019-01-30
Payer: MEDICARE

## 2019-01-30 VITALS
HEART RATE: 80 BPM | WEIGHT: 112.38 LBS | SYSTOLIC BLOOD PRESSURE: 128 MMHG | RESPIRATION RATE: 16 BRPM | TEMPERATURE: 98 F | BODY MASS INDEX: 20.56 KG/M2 | DIASTOLIC BLOOD PRESSURE: 82 MMHG

## 2019-01-30 DIAGNOSIS — M81.0 AGE-RELATED OSTEOPOROSIS WITHOUT CURRENT PATHOLOGICAL FRACTURE: ICD-10-CM

## 2019-01-30 DIAGNOSIS — R19.7 DIARRHEA, UNSPECIFIED TYPE: ICD-10-CM

## 2019-01-30 DIAGNOSIS — I70.1 RAS (RENAL ARTERY STENOSIS): ICD-10-CM

## 2019-01-30 DIAGNOSIS — R05.9 COUGH: ICD-10-CM

## 2019-01-30 DIAGNOSIS — I10 ESSENTIAL HYPERTENSION: ICD-10-CM

## 2019-01-30 DIAGNOSIS — J06.9 UPPER RESPIRATORY TRACT INFECTION, UNSPECIFIED TYPE: ICD-10-CM

## 2019-01-30 DIAGNOSIS — K21.9 GASTROESOPHAGEAL REFLUX DISEASE, ESOPHAGITIS PRESENCE NOT SPECIFIED: Primary | ICD-10-CM

## 2019-01-30 PROCEDURE — 3074F PR MOST RECENT SYSTOLIC BLOOD PRESSURE < 130 MM HG: ICD-10-PCS | Mod: ,,, | Performed by: INTERNAL MEDICINE

## 2019-01-30 PROCEDURE — 1101F PT FALLS ASSESS-DOCD LE1/YR: CPT | Mod: ,,, | Performed by: INTERNAL MEDICINE

## 2019-01-30 PROCEDURE — 3079F DIAST BP 80-89 MM HG: CPT | Mod: ,,, | Performed by: INTERNAL MEDICINE

## 2019-01-30 PROCEDURE — 3074F SYST BP LT 130 MM HG: CPT | Mod: ,,, | Performed by: INTERNAL MEDICINE

## 2019-01-30 PROCEDURE — 99205 PR OFFICE/OUTPT VISIT, NEW, LEVL V, 60-74 MIN: ICD-10-PCS | Mod: ,,, | Performed by: INTERNAL MEDICINE

## 2019-01-30 PROCEDURE — 99205 OFFICE O/P NEW HI 60 MIN: CPT | Mod: ,,, | Performed by: INTERNAL MEDICINE

## 2019-01-30 PROCEDURE — 3079F PR MOST RECENT DIASTOLIC BLOOD PRESSURE 80-89 MM HG: ICD-10-PCS | Mod: ,,, | Performed by: INTERNAL MEDICINE

## 2019-01-30 PROCEDURE — 1101F PR PT FALLS ASSESS DOC 0-1 FALLS W/OUT INJ PAST YR: ICD-10-PCS | Mod: ,,, | Performed by: INTERNAL MEDICINE

## 2019-01-30 NOTE — PROGRESS NOTES
[facility] Established Patient Visit    Subjective:           PCP: Marcia Calzada    Chief Complaint: Uncontrollable bowels       HPI:  Jessica Naidu is a 84 y.o. female here for eructations, reflux, and has diarrhea. Patient has hx of arhtiris, cataracts, htn, kidney disease, osteoporosis, cosmetic surgery, eye surgery, and allergies. She has had a colonoscopy in the past. Mother and father had heart disease, htn, and kidney disease. Medications have been reviewed in detail. A detailed physical exam is noncontributory. The patient has respiratory symptoms and needs a chest x-ray.      ROS:   Constitutional: No fevers, chills, weight loss  ENT: No allergies, sore throat, rhinorrhea  CV: No chest pain, palpitations, edema  Pulm: No cough, shortness of breath, wheezing  Ophtho: No vision changes  GI: No blood in stools, change in bowel habits, nausea/vomiting  Denies alternating diarrhea/constipation.   Derm: No rash  Heme: No easy bruising or lymphadenopathy  MSK: No arthralgias or myalgias  : No dysuria, hematuria, frequency, polyuria, or flank tenderness  Endo: No hot or cold intolerance  Neuro: No syncope or seizure, or dizziness  Psych: No hallucination, depression or suicidal ideation      Medical History:  has a past medical history of Arthritis, Hearing loss, conductive, bilateral, High cholesterol, History of shingles, Hypertension, Kidney artery constriction, Lumbar radiculopathy, Osteoporosis, PONV (postoperative nausea and vomiting), PVD (peripheral vascular disease), and Stroke.      Surgical History:  has a past surgical history that includes Hysterectomy; Appendectomy; Bladder suspension; Anterior cervical discectomy w/ fusion; Colonoscopy; Eye surgery; Cosmetic surgery; and Cervical spine surgery.    Family History:   Family History   Problem Relation Age of Onset    Arthritis Mother     Hearing loss Mother     Heart disease Mother     Hypertension Mother     Kidney disease Mother      Stroke Mother     Heart disease Father        Social History:   Social History     Tobacco Use    Smoking status: Former Smoker     Packs/day: 1.00     Types: Cigarettes    Smokeless tobacco: Never Used    Tobacco comment: quit 30 yrs ago   Substance Use Topics    Alcohol use: No    Drug use: No       The patient's social and family histories were reviewed by me and updated in the appropriate section of the electronic medical record.    Allergies:   Review of patient's allergies indicates:   Allergen Reactions    Adhesive     Arb-angiotensin receptor antagonist     Atacand [candesartan]     Bactrim [sulfamethoxazole-trimethoprim]     Codeine Hives    Penicillins Hives    Trental [pentoxifylline]          Medications:   Current Outpatient Medications   Medication Sig Dispense Refill    alendronate (FOSAMAX) 70 MG tablet Take 1 tablet (70 mg total) by mouth every 7 days. 12 tablet 1    atorvastatin (LIPITOR) 10 MG tablet Take 1 tablet (10 mg total) by mouth once daily. 90 tablet 1    azithromycin 200 mg/5 ml (ZITHROMAX) 200 mg/5 mL suspension Take 6 mLs (240 mg total) by mouth once daily. for 5 days 30 mL 0    brimonidine 0.1% (ALPHAGAN P) 0.1 % Drop Place 1 drop into both eyes 3 (three) times daily.        calcitRIOL (ROCALTROL) 0.25 MCG Cap Take 1 capsule (0.25 mcg total) by mouth once daily. 90 capsule 1    cilostazol (PLETAL) 50 MG Tab Take 2 tablets (100 mg total) by mouth 2 (two) times daily. 360 tablet 1    diltiaZEM (CARDIZEM CD) 360 MG 24 hr capsule Take 1 capsule (360 mg total) by mouth once daily. 90 capsule 1    donepezil (ARICEPT) 10 MG tablet Take 1 tablet (10 mg total) by mouth once daily. 90 tablet 1    hydrocodone-acetaminophen (HYCET) solution 7.5-325 mg/15mL TK 5 ML PO ONCE A DAY PRN P  0    memantine (NAMENDA) 10 MG Tab Take 1 tablet (10 mg total) by mouth 2 (two) times daily. 90 tablet 1    ofloxacin (OCUFLOX) 0.3 % ophthalmic solution       omeprazole (PRILOSEC) 40  MG capsule Take 1 capsule (40 mg total) by mouth once daily. 90 capsule 1    trimethoprim (TRIMPEX) 100 mg Tab Take 1 tablet (100 mg total) by mouth every evening. 42 tablet 6    atomoxetine (STRATTERA) 10 MG capsule Take 1 capsule (10 mg total) by mouth once daily. 30 capsule 0    ipratropium (ATROVENT) 0.02 % nebulizer solution ipratropium bromide       No current facility-administered medications for this visit.      All medications and past history have been reviewed.        Objective:        Vital Signs:  Blood pressure 128/82, pulse 80, temperature 97.5 °F (36.4 °C), resp. rate 16, weight 51 kg (112 lb 6.4 oz). Body mass index is 20.56 kg/m².    Physical Exam:   General Appearance: Well appearing in no acute distress, well developed, well                nourished  Head: Normocephalic, without obvious abnormality  Eyes:  Pupils equal, round and reactive to light  Throat: Lips, mucosa, and tongue normal; teeth and gums normal  Lungs: CTA bilaterally in anterior and posterior fields, no wheezes, no crackles  Heart:  Regular rate and rhythm, no murmurs heard  Abdomen: Soft, non tender, non distended with positive bowel sounds in all four quadrants. No hepatosplenomegaly, ascites, or mass. Negative for succusion splash  : female   Extremities: No cyanosis, edema or deformity  Skin: No rash  Neurologic: Normal exam    Labs:  No results found for: WBC, HGB, HCT, PLT, CHOL, TRIG, HDL, LDLDIRECT, ALT, AST, NA, K, CL, CREATININE, BUN, CO2, TSH, PSA, INR, GLUF, HGBA1C, MICROALBUR    Imaging:     All lab results and imaging results have been reviewed and discussed with the patient      Assessment:       No diagnosis found.    1. GERD  2. Diarrhea  3. Upper respiratory tract infection  See visit diagnoses  Plan:       There are no diagnoses linked to this encounter.    See HPI    No Follow-up on file.    The plan was discussed with the patient and all questions/concerns have been answered to the patient's  satisfaction.        Electronically signed by Arabella Russ MD    This note was dictated using voice recognition software and may contain grammatical errors.

## 2019-02-06 ENCOUNTER — OFFICE VISIT (OUTPATIENT)
Dept: FAMILY MEDICINE | Facility: CLINIC | Age: 84
End: 2019-02-06
Payer: MEDICARE

## 2019-02-06 VITALS
WEIGHT: 108.19 LBS | HEART RATE: 84 BPM | OXYGEN SATURATION: 96 % | SYSTOLIC BLOOD PRESSURE: 138 MMHG | DIASTOLIC BLOOD PRESSURE: 70 MMHG | HEIGHT: 62 IN | BODY MASS INDEX: 19.91 KG/M2

## 2019-02-06 DIAGNOSIS — B37.2 YEAST DERMATITIS: ICD-10-CM

## 2019-02-06 DIAGNOSIS — J20.9 ACUTE BRONCHITIS, UNSPECIFIED ORGANISM: Primary | ICD-10-CM

## 2019-02-06 PROCEDURE — 99214 PR OFFICE/OUTPT VISIT, EST, LEVL IV, 30-39 MIN: ICD-10-PCS | Mod: ,,, | Performed by: NURSE PRACTITIONER

## 2019-02-06 PROCEDURE — 1101F PR PT FALLS ASSESS DOC 0-1 FALLS W/OUT INJ PAST YR: ICD-10-PCS | Mod: ,,, | Performed by: NURSE PRACTITIONER

## 2019-02-06 PROCEDURE — 3078F DIAST BP <80 MM HG: CPT | Mod: ,,, | Performed by: NURSE PRACTITIONER

## 2019-02-06 PROCEDURE — 1101F PT FALLS ASSESS-DOCD LE1/YR: CPT | Mod: ,,, | Performed by: NURSE PRACTITIONER

## 2019-02-06 PROCEDURE — 3078F PR MOST RECENT DIASTOLIC BLOOD PRESSURE < 80 MM HG: ICD-10-PCS | Mod: ,,, | Performed by: NURSE PRACTITIONER

## 2019-02-06 PROCEDURE — 3075F PR MOST RECENT SYSTOLIC BLOOD PRESS GE 130-139MM HG: ICD-10-PCS | Mod: ,,, | Performed by: NURSE PRACTITIONER

## 2019-02-06 PROCEDURE — 99214 OFFICE O/P EST MOD 30 MIN: CPT | Mod: ,,, | Performed by: NURSE PRACTITIONER

## 2019-02-06 PROCEDURE — 3075F SYST BP GE 130 - 139MM HG: CPT | Mod: ,,, | Performed by: NURSE PRACTITIONER

## 2019-02-06 RX ORDER — CEFDINIR 300 MG/1
300 CAPSULE ORAL 2 TIMES DAILY
Qty: 10 CAPSULE | Refills: 0 | Status: SHIPPED | OUTPATIENT
Start: 2019-02-06 | End: 2019-02-16

## 2019-02-06 RX ORDER — CLOTRIMAZOLE 1 %
CREAM (GRAM) TOPICAL 2 TIMES DAILY
Qty: 60 G | Refills: 1 | Status: SHIPPED | OUTPATIENT
Start: 2019-02-06 | End: 2019-04-30

## 2019-02-06 RX ORDER — GUAIFENESIN AND DEXTROMETHORPHAN HYDROBROMIDE 1200; 60 MG/1; MG/1
1 TABLET, EXTENDED RELEASE ORAL 2 TIMES DAILY
Qty: 30 TABLET | Refills: 1 | Status: SHIPPED | OUTPATIENT
Start: 2019-02-06 | End: 2019-02-16

## 2019-02-06 RX ORDER — ALBUTEROL SULFATE 1.25 MG/3ML
1.25 SOLUTION RESPIRATORY (INHALATION) EVERY 6 HOURS PRN
Qty: 1 BOX | Refills: 0 | Status: SHIPPED | OUTPATIENT
Start: 2019-02-06 | End: 2019-04-30

## 2019-02-06 RX ORDER — PREDNISONE 20 MG/1
60 TABLET ORAL DAILY
Qty: 15 TABLET | Refills: 0 | Status: SHIPPED | OUTPATIENT
Start: 2019-02-06 | End: 2019-03-14 | Stop reason: ALTCHOICE

## 2019-02-06 NOTE — PROGRESS NOTES
SUBJECTIVE:      Patient ID: Jessica Naidu is a 84 y.o. female.    Chief Complaint: Cough (x 3 weeks) and Rash (between buttocks x 6-8 months)    Jessica is here today with c/o cough for about a month.  She is an established patient of Dr. Calzada.  This is my first encounter with her.  She reports that she had a CXR and took azithromycin last week, but is continuing to get worse.  She denies fever this week.  Her states that she did have fever one day last week.      Cough   This is a new problem. The current episode started more than 1 month ago. The problem has been gradually worsening. The problem occurs every few minutes. The cough is productive of purulent sputum and productive of sputum. Associated symptoms include chills, a fever (last week), nasal congestion, postnasal drip, a rash, rhinorrhea, shortness of breath, weight loss and wheezing. Pertinent negatives include no chest pain, ear congestion, ear pain, eye redness, headaches, heartburn, hemoptysis, myalgias, sore throat or sweats. She has tried OTC cough suppressant, prescription cough suppressant and rest for the symptoms. The treatment provided no relief.   Rash   This is a new problem. The current episode started more than 1 month ago. The problem has been waxing and waning since onset. The affected locations include the left buttock and right buttock. The rash is characterized by redness, itchiness and pain. She was exposed to nothing. Associated symptoms include congestion, coughing, a fever (last week), rhinorrhea and shortness of breath. Pertinent negatives include no fatigue or sore throat. Past treatments include antibiotic cream. The treatment provided mild relief.       Past Surgical History:   Procedure Laterality Date    ANTERIOR CERVICAL DISCECTOMY W/ FUSION      APPENDECTOMY      BLADDER SUSPENSION      BLOCK, NERVE, FACET JOINT, LUMBAR, MEDIAL BRANCH Right 10/25/2012    Performed by [facility] at [facility] OR     CERVICAL SPINE SURGERY      COLONOSCOPY      COSMETIC SURGERY      EYE SURGERY      HYSTERECTOMY      RADIOFREQUENCY ABLATION, NERVE, SPINAL, LUMBAR, MEDIAL BRANCH, 1 LEVEL Right 11/8/2012    Performed by Jj Timmons MD at [facility] OR     Family History   Problem Relation Age of Onset    Arthritis Mother     Hearing loss Mother     Heart disease Mother     Hypertension Mother     Kidney disease Mother     Stroke Mother     Heart disease Father       Social History     Socioeconomic History    Marital status: Single     Spouse name: None    Number of children: None    Years of education: None    Highest education level: None   Social Needs    Financial resource strain: None    Food insecurity - worry: None    Food insecurity - inability: None    Transportation needs - medical: None    Transportation needs - non-medical: None   Occupational History    None   Tobacco Use    Smoking status: Former Smoker     Packs/day: 1.00     Types: Cigarettes    Smokeless tobacco: Never Used    Tobacco comment: quit 30 yrs ago   Substance and Sexual Activity    Alcohol use: No    Drug use: No    Sexual activity: None   Other Topics Concern    None   Social History Narrative    None     Current Outpatient Medications   Medication Sig Dispense Refill    alendronate (FOSAMAX) 70 MG tablet Take 1 tablet (70 mg total) by mouth every 7 days. 12 tablet 1    atorvastatin (LIPITOR) 10 MG tablet Take 1 tablet (10 mg total) by mouth once daily. 90 tablet 1    brimonidine 0.1% (ALPHAGAN P) 0.1 % Drop Place 1 drop into both eyes 3 (three) times daily.        calcitRIOL (ROCALTROL) 0.25 MCG Cap Take 1 capsule (0.25 mcg total) by mouth once daily. 90 capsule 1    cilostazol (PLETAL) 50 MG Tab Take 2 tablets (100 mg total) by mouth 2 (two) times daily. 360 tablet 1    diltiaZEM (CARDIZEM CD) 360 MG 24 hr capsule Take 1 capsule (360 mg total) by mouth once daily. 90 capsule 1    donepezil (ARICEPT) 10 MG  tablet Take 1 tablet (10 mg total) by mouth once daily. 90 tablet 1    hydrocodone-acetaminophen (HYCET) solution 7.5-325 mg/15mL TK 5 ML PO ONCE A DAY PRN P  0    ipratropium (ATROVENT) 0.02 % nebulizer solution ipratropium bromide      memantine (NAMENDA) 10 MG Tab Take 1 tablet (10 mg total) by mouth 2 (two) times daily. 90 tablet 1    ofloxacin (OCUFLOX) 0.3 % ophthalmic solution       omeprazole (PRILOSEC) 40 MG capsule Take 1 capsule (40 mg total) by mouth once daily. 90 capsule 1    trimethoprim (TRIMPEX) 100 mg Tab Take 1 tablet (100 mg total) by mouth every evening. 42 tablet 6    albuterol (ACCUNEB) 1.25 mg/3 mL Nebu Take 3 mLs (1.25 mg total) by nebulization every 6 (six) hours as needed. 1 Box 0    atomoxetine (STRATTERA) 10 MG capsule Take 1 capsule (10 mg total) by mouth once daily. 30 capsule 0    cefdinir (OMNICEF) 300 MG capsule Take 1 capsule (300 mg total) by mouth 2 (two) times daily. for 10 days 10 capsule 0    clotrimazole (LOTRIMIN) 1 % cream Apply topically 2 (two) times daily. 60 g 1    dextromethorphan-guaifenesin (MUCINEX DM) 60-1,200 mg per 12 hr tablet Take 1 tablet by mouth 2 (two) times daily. for 10 days 30 tablet 1    predniSONE (DELTASONE) 20 MG tablet Take 3 tablets (60 mg total) by mouth once daily. For 3 days then 40 mg daily for 2 days then 20 mg daily for 2 days 15 tablet 0     No current facility-administered medications for this visit.      Review of patient's allergies indicates:   Allergen Reactions    Adhesive     Arb-angiotensin receptor antagonist     Atacand [candesartan]     Bactrim [sulfamethoxazole-trimethoprim]     Codeine Hives    Penicillins Hives    Trental [pentoxifylline]       Past Medical History:   Diagnosis Date    Arthritis     Hearing loss, conductive, bilateral     High cholesterol     History of shingles     vaginal    Hypertension     Kidney artery constriction     has renal artery stent    Lumbar radiculopathy      "Osteoporosis     PONV (postoperative nausea and vomiting)     PVD (peripheral vascular disease)     Stroke     of eye vasculature     Past Surgical History:   Procedure Laterality Date    ANTERIOR CERVICAL DISCECTOMY W/ FUSION      APPENDECTOMY      BLADDER SUSPENSION      BLOCK, NERVE, FACET JOINT, LUMBAR, MEDIAL BRANCH Right 10/25/2012    [facility]    CERVICAL SPINE SURGERY      COLONOSCOPY      COSMETIC SURGERY      EYE SURGERY      HYSTERECTOMY      RADIOFREQUENCY ABLATION, NERVE, SPINAL, LUMBAR, MEDIAL BRANCH, 1 LEVEL Right 11/8/2012    Per[facility] by [facility] at [facility] OR       Review of Systems   Constitutional: Positive for chills, diaphoresis (last week), fever (last week) and weight loss. Negative for fatigue.   HENT: Positive for congestion, postnasal drip and rhinorrhea. Negative for ear pain, nosebleeds, sinus pressure, sneezing, sore throat and voice change.    Eyes: Negative for photophobia, redness, itching and visual disturbance.   Respiratory: Positive for cough, shortness of breath and wheezing. Negative for hemoptysis and choking.    Cardiovascular: Negative for chest pain, palpitations and leg swelling.   Gastrointestinal: Negative for abdominal distention, abdominal pain and heartburn.   Musculoskeletal: Negative for myalgias.   Skin: Positive for rash.   Neurological: Negative for headaches.      OBJECTIVE:      Vitals:    02/06/19 1247   BP: 138/70   Pulse: 84   SpO2: 96%   Weight: 49.1 kg (108 lb 3.2 oz)   Height: 5' 2" (1.575 m)     Physical Exam   Constitutional: She appears well-developed and well-nourished. No distress.   HENT:   Head: Normocephalic and atraumatic.   Right Ear: Tympanic membrane, external ear and ear canal normal.   Left Ear: Tympanic membrane, external ear and ear canal normal.   Nose: Nose normal. No mucosal edema or rhinorrhea.   Mouth/Throat: Uvula is midline and oropharynx is clear and moist. No " oropharyngeal exudate, posterior oropharyngeal edema or posterior oropharyngeal erythema.   Eyes: Conjunctivae and lids are normal. Pupils are equal, round, and reactive to light. Right eye exhibits no discharge. Left eye exhibits no discharge. No scleral icterus.   Neck: Normal range of motion. Neck supple. Carotid bruit is not present. No tracheal deviation present. No thyromegaly present.   Cardiovascular: Normal rate, regular rhythm, normal heart sounds and intact distal pulses. Exam reveals no gallop and no friction rub.   No murmur heard.  Pulmonary/Chest: Effort normal. No stridor. No respiratory distress. She has wheezes in the right upper field, the right middle field and the right lower field. She has no rales.   Abdominal: Soft. Bowel sounds are normal. She exhibits no distension and no mass. There is no hepatosplenomegaly. There is no tenderness. There is no rigidity, no rebound, no guarding and no CVA tenderness.   Musculoskeletal: Normal range of motion. She exhibits no edema.   Lymphadenopathy:     She has no cervical adenopathy.   Neurological: She is alert.   Skin: Skin is warm, dry and intact. Capillary refill takes less than 2 seconds. She is not diaphoretic. There is erythema (bilateral buttocks).   Psychiatric: She has a normal mood and affect. She expresses no suicidal plans.      Assessment:       1. Acute bronchitis, unspecified organism    2. Yeast dermatitis        Plan:       Acute bronchitis, unspecified organism   CXR report from last week reviewed.  -     albuterol (ACCUNEB) 1.25 mg/3 mL Nebu; Take 3 mLs (1.25 mg total) by nebulization every 6 (six) hours as needed.  Dispense: 1 Box; Refill: 0  -     dextromethorphan-guaifenesin (MUCINEX DM) 60-1,200 mg per 12 hr tablet; Take 1 tablet by mouth 2 (two) times daily. for 10 days  Dispense: 30 tablet; Refill: 1  -     cefdinir (OMNICEF) 300 MG capsule; Take 1 capsule (300 mg total) by mouth 2 (two) times daily. for 10 days  Dispense: 10  capsule; Refill: 0  -     predniSONE (DELTASONE) 20 MG tablet; Take 3 tablets (60 mg total) by mouth once daily. For 3 days then 40 mg daily for 2 days then 20 mg daily for 2 days  Dispense: 15 tablet; Refill: 0   ER if worsens; follow up if no improvement; understanding voiced by daughter    Yeast dermatitis  -     clotrimazole (LOTRIMIN) 1 % cream; Apply topically 2 (two) times daily.  Dispense: 60 g; Refill: 1   Apply boudreauxs butt paste after application of lotrimin      Follow-up if symptoms worsen or fail to improve.      2/6/2019 NESSA Marshall, FNP

## 2019-02-06 NOTE — PATIENT INSTRUCTIONS
What Is Acute Bronchitis?  Acute bronchitis is when the airways in your lungs (bronchial tubes) become red and swollen (inflamed). It is usually caused by a viral infection. But it can also occur because of a bacteria or allergen. Symptoms include a cough that produces yellow or greenish mucus and can last for days or sometimes weeks.  Inside healthy lungs    Air travels in and out of the lungs through the airways. The linings of these airways produce sticky mucus. This mucus traps particles that enter the lungs. Tiny structures called cilia then sweep the particles out of the airways.     Healthy airway: Airways are normally open. Air moves in and out easily.      Healthy cilia: Tiny, hairlike cilia sweep mucus and particles up and out of the airways.   Lungs with bronchitis  Bronchitis often occurs with a cold or the flu virus. The airways become inflamed (red and swollen). There is a deep hacking cough from the extra mucus. Other symptoms may include:  · Wheezing  · Chest discomfort  · Shortness of breath  · Mild fever  A second infection, this time due to bacteria, may then occur. And airways irritated by allergens or smoke are more likely to get infected.        Inflamed airway: Inflammation and extra mucus narrow the airway, causing shortness of breath.      Impaired cilia: Extra mucus impairs cilia, causing congestion and wheezing. Smoking makes the problem worse.   Making a diagnosis  A physical exam, health history, and certain tests help your healthcare provider make the diagnosis.  Health history  Your healthcare provider will ask you about your symptoms.  The exam  Your provider listens to your chest for signs of congestion. He or she may also check your ears, nose, and throat.  Possible tests  · A sputum test for bacteria. This requires a sample of mucus from your lungs.  · A nasal or throat swab. This tests to see if you have a bacterial infection.  · A chest X-ray. This is done if your healthcare  provider thinks you have pneumonia.  · Tests to check for an underlying condition. Other tests may be done to check for things such as allergies, asthma, or COPD (chronic obstructive pulmonary disease). You may need to see a specialist for more lung function testing.  Treating a cough  The main treatment for bronchitis is easing symptoms. Avoiding smoke, allergens, and other things that trigger coughing can often help. If the infection is bacterial, you may be given antibiotics. During the illness, it's important to get plenty of sleep. To ease symptoms:  · Dont smoke. Also avoid secondhand smoke.  · Use a humidifier. Or try breathing in steam from a hot shower. This may help loosen mucus.  · Drink a lot of water and juice. They can soothe the throat and may help thin mucus.  · Sit up or use extra pillows when in bed. This helps to lessen coughing and congestion.  · Ask your provider about using medicine. Ask about using cough medicine, pain and fever medicine, or a decongestant.  Antibiotics  Most cases of bronchitis are caused by cold or flu viruses. They dont need antibiotics to treat them, even if your mucus is thick and green or yellow. Antibiotics dont treat viral illness and antibiotics have not been shown to have any benefit in cases of acute bronchitis. Taking antibiotics when they are not needed increases your risk of getting an infection later that is antibiotic-resistant. Antibiotics can also cause severe cases of diarrhea that require other antibiotics to treat.  It is important that you accept your healthcare provider's opinion to not use antibiotics. Your provider will prescribe antibiotics if the infection is caused by bacteria. If they are prescribed:  · Take all of the medicine. Take the medicine until it is used up, even if symptoms have improved. If you dont, the bronchitis may come back.  · Take the medicines as directed. For instance, some medicines should be taken with food.  · Ask about  side effects. Ask your provider or pharmacist what side effects are common, and what to do about them.  Follow-up care  You should see your provider again in 2 to 3 weeks. By this time, symptoms should have improved. An infection that lasts longer may mean you have a more serious problem.  Prevention  · Avoid tobacco smoke. If you smoke, quit. Stay away from smoky places. Ask friends and family not to smoke around you, or in your home or car.  · Get checked for allergies.  · Ask your provider about getting a yearly flu shot. Also ask about pneumococcal or pneumonia shots.  · Wash your hands often. This helps reduce the chance of picking up viruses that cause colds and flu.  Call your healthcare provider if:  · Symptoms worsen, or you have new symptoms  · Breathing problems worsen or  become severe  · Symptoms dont get better within a week, or within 3 days of taking antibiotics   Date Last Reviewed: 2/1/2017  © 8281-2714 [employer]. [address] [place] [address], [place] [address]. All rights reserved. This information is not intended as a substitute for professional medical care. Always follow your healthcare professional's instructions.

## 2019-02-11 ENCOUNTER — TELEPHONE (OUTPATIENT)
Dept: FAMILY MEDICINE | Facility: CLINIC | Age: 84
End: 2019-02-11

## 2019-02-11 NOTE — TELEPHONE ENCOUNTER
Previous DXA from 2018 interprets Osteoporosis, patient on fosamax.  Health maintenance updated to reflect.

## 2019-02-11 NOTE — TELEPHONE ENCOUNTER
----- Message from Charissa Storm sent at 2/7/2019  2:21 PM CST -----  RADIOLOGY, [facility], 9/10/18

## 2019-03-14 ENCOUNTER — OFFICE VISIT (OUTPATIENT)
Dept: FAMILY MEDICINE | Facility: CLINIC | Age: 84
End: 2019-03-14
Payer: MEDICARE

## 2019-03-14 VITALS
DIASTOLIC BLOOD PRESSURE: 70 MMHG | OXYGEN SATURATION: 95 % | HEART RATE: 72 BPM | SYSTOLIC BLOOD PRESSURE: 138 MMHG | TEMPERATURE: 98 F | BODY MASS INDEX: 20.32 KG/M2 | RESPIRATION RATE: 24 BRPM | WEIGHT: 111.13 LBS

## 2019-03-14 DIAGNOSIS — R05.3 PERSISTENT COUGH FOR 3 WEEKS OR LONGER: Primary | ICD-10-CM

## 2019-03-14 DIAGNOSIS — J84.9 INTERSTITIAL LUNG DISEASE: ICD-10-CM

## 2019-03-14 DIAGNOSIS — N39.0 URINARY TRACT INFECTION WITHOUT HEMATURIA, SITE UNSPECIFIED: ICD-10-CM

## 2019-03-14 DIAGNOSIS — J40 BRONCHITIS: ICD-10-CM

## 2019-03-14 DIAGNOSIS — J32.4 CHRONIC PANSINUSITIS: ICD-10-CM

## 2019-03-14 PROCEDURE — 3075F PR MOST RECENT SYSTOLIC BLOOD PRESS GE 130-139MM HG: ICD-10-PCS | Mod: ,,, | Performed by: INTERNAL MEDICINE

## 2019-03-14 PROCEDURE — 99214 PR OFFICE/OUTPT VISIT, EST, LEVL IV, 30-39 MIN: ICD-10-PCS | Mod: ,,, | Performed by: INTERNAL MEDICINE

## 2019-03-14 PROCEDURE — 1101F PT FALLS ASSESS-DOCD LE1/YR: CPT | Mod: ,,, | Performed by: INTERNAL MEDICINE

## 2019-03-14 PROCEDURE — 1101F PR PT FALLS ASSESS DOC 0-1 FALLS W/OUT INJ PAST YR: ICD-10-PCS | Mod: ,,, | Performed by: INTERNAL MEDICINE

## 2019-03-14 PROCEDURE — 3078F PR MOST RECENT DIASTOLIC BLOOD PRESSURE < 80 MM HG: ICD-10-PCS | Mod: ,,, | Performed by: INTERNAL MEDICINE

## 2019-03-14 PROCEDURE — 99214 OFFICE O/P EST MOD 30 MIN: CPT | Mod: ,,, | Performed by: INTERNAL MEDICINE

## 2019-03-14 PROCEDURE — 3075F SYST BP GE 130 - 139MM HG: CPT | Mod: ,,, | Performed by: INTERNAL MEDICINE

## 2019-03-14 PROCEDURE — 3078F DIAST BP <80 MM HG: CPT | Mod: ,,, | Performed by: INTERNAL MEDICINE

## 2019-03-14 RX ORDER — BENZONATATE 100 MG/1
100 CAPSULE ORAL 3 TIMES DAILY PRN
Qty: 30 CAPSULE | Refills: 0 | Status: SHIPPED | OUTPATIENT
Start: 2019-03-14 | End: 2019-04-30

## 2019-03-14 RX ORDER — IPRATROPIUM BROMIDE AND ALBUTEROL SULFATE 2.5; .5 MG/3ML; MG/3ML
3 SOLUTION RESPIRATORY (INHALATION) EVERY 6 HOURS PRN
Qty: 1 BOX | Refills: 1 | Status: SHIPPED | OUTPATIENT
Start: 2019-03-14 | End: 2019-04-30 | Stop reason: SDUPTHER

## 2019-03-14 RX ORDER — LEVOFLOXACIN 250 MG/1
250 TABLET ORAL DAILY
Qty: 10 TABLET | Refills: 0 | Status: SHIPPED | OUTPATIENT
Start: 2019-03-14 | End: 2019-04-30

## 2019-03-14 NOTE — PROGRESS NOTES
"  SUBJECTIVE:    Patient ID: Jessica Naidu is a 84 y.o. female.    Chief Complaint: Chest Congestion (x 1 month ) and Cough (x 1 month )    HPI     Pt comes  (brought by her daughter) complaining of two months of cough, productive of mucous-and constant "rattling" in the chest with underlying dx  interstitial lung disease-she has no inhaler at home-would not be coordinated enough to use it- no fever-feeling bad-was seen here and placed on omnicef, without response-daughter giving her one neb treatment a day but its hard to get her to take more and even all of that.No chest pain, no pleurisy,no complaint of shortness of breath, no fever, no orthopnea or PND-no wheezing--PT HAS BRONCHIECTASIS AND NEEDS PNEUMATIC VEST BECAUSE THERE IS NI INE IN THE HOME THAT CAN GIVE MANUAL CHEST PT    Office Visit on 11/20/2018   Component Date Value Ref Range Status    Color, UA 11/20/2018 dark yellow   Final    Spec Grav UA 11/20/2018 1.015   Final    pH, UA 11/20/2018 6   Final    WBC, UA 11/20/2018 trace   Final    Nitrite, UA 11/20/2018 positive   Final    Protein 11/20/2018 1+   Final    Glucose, UA 11/20/2018 norm   Final    Ketones, UA 11/20/2018 neg   Final    Urobilinogen, UA 11/20/2018 norm   Final    Bilirubin 11/20/2018 neg   Final    Blood, UA 11/20/2018 trace   Final    Urine Culture, Routine 11/20/2018    Final                    Value:PSEUDOMONAS AERUGINOSA  >100,000 cfu/ml         Past Medical History:   Diagnosis Date    Arthritis     Hearing loss, conductive, bilateral     High cholesterol     History of shingles     vaginal    Hypertension     Kidney artery constriction     has renal artery stent    Lumbar radiculopathy     Osteoporosis     PONV (postoperative nausea and vomiting)     PVD (peripheral vascular disease)     Stroke     of eye vasculature     Past Surgical History:   Procedure Laterality Date    ANTERIOR CERVICAL DISCECTOMY W/ FUSION      APPENDECTOMY      BLADDER " SUSPENSION      BLOCK, NERVE, FACET JOINT, LUMBAR, MEDIAL BRANCH Right 10/25/2012    Performed by [facility]mons [facility] at [facility]    CERVICAL SPINE SURGERY      COLONOSCOPY      COSMETIC SURGERY      EYE SURGERY      HYSTERECTOMY      RADIOFREQUENCY ABLATION, NERVE, SPINAL, LUMBAR, MEDIAL BRANCH, 1 LEVEL Right 11/8/2012    Performed by Jj Timmons MD at [facility] [place]     Family History   Problem Relation Age of Onset    Arthritis Mother     Hearing loss Mother     Heart disease Mother     Hypertension Mother     Kidney disease Mother     Stroke Mother     Heart disease Father        Marital Status: Single  Alcohol History:  reports that she does not drink alcohol.  Tobacco History:  reports that she has quit smoking. Her smoking use included cigarettes. She smoked 1.00 pack per day. she has never used smokeless tobacco.  Drug History:  reports that she does not use drugs.    Review of patient's allergies indicates:   Allergen Reactions    Adhesive     Arb-angiotensin receptor antagonist     Atacand [candesartan]     Bactrim [sulfamethoxazole-trimethoprim]     Codeine Hives    Penicillins Hives    Trental [pentoxifylline]        Current Outpatient Medications:     albuterol (ACCUNEB) 1.25 mg/3 mL Nebu, Take 3 mLs (1.25 mg total) by nebulization every 6 (six) hours as needed., Disp: 1 Box, Rfl: 0    alendronate (FOSAMAX) 70 MG tablet, Take 1 tablet (70 mg total) by mouth every 7 days., Disp: 12 tablet, Rfl: 1    atomoxetine (STRATTERA) 10 MG capsule, Take 1 capsule (10 mg total) by mouth once daily., Disp: 30 capsule, Rfl: 0    atorvastatin (LIPITOR) 10 MG tablet, Take 1 tablet (10 mg total) by mouth once daily., Disp: 90 tablet, Rfl: 1    brimonidine 0.1% (ALPHAGAN P) 0.1 % Drop, Place 1 drop into both eyes 3 (three) times daily.  , Disp: , Rfl:     calcitRIOL (ROCALTROL) 0.25 MCG Cap, Take 1 capsule (0.25 mcg total) by mouth once daily., Disp: 90 capsule, Rfl: 1    cilostazol (PLETAL)  50 MG Tab, Take 2 tablets (100 mg total) by mouth 2 (two) times daily., Disp: 360 tablet, Rfl: 1    clotrimazole (LOTRIMIN) 1 % cream, Apply topically 2 (two) times daily., Disp: 60 g, Rfl: 1    diltiaZEM (CARDIZEM CD) 360 MG 24 hr capsule, Take 1 capsule (360 mg total) by mouth once daily., Disp: 90 capsule, Rfl: 1    donepezil (ARICEPT) 10 MG tablet, Take 1 tablet (10 mg total) by mouth once daily., Disp: 90 tablet, Rfl: 1    hydrocodone-acetaminophen (HYCET) solution 7.5-325 mg/15mL, TK 5 ML PO ONCE A DAY PRN P, Disp: , Rfl: 0    ipratropium (ATROVENT) 0.02 % nebulizer solution, ipratropium bromide, Disp: , Rfl:     memantine (NAMENDA) 10 MG Tab, Take 1 tablet (10 mg total) by mouth 2 (two) times daily., Disp: 90 tablet, Rfl: 1    ofloxacin (OCUFLOX) 0.3 % ophthalmic solution, , Disp: , Rfl:     omeprazole (PRILOSEC) 40 MG capsule, Take 1 capsule (40 mg total) by mouth once daily., Disp: 90 capsule, Rfl: 1    trimethoprim (TRIMPEX) 100 mg Tab, Take 1 tablet (100 mg total) by mouth every evening., Disp: 42 tablet, Rfl: 6    albuterol-ipratropium (DUO-NEB) 2.5 mg-0.5 mg/3 mL nebulizer solution, Take 3 mLs by nebulization every 6 (six) hours as needed for Wheezing. Rescue, Disp: 1 Box, Rfl: 1    benzonatate (TESSALON) 100 MG capsule, Take 1 capsule (100 mg total) by mouth 3 (three) times daily as needed for Cough., Disp: 30 capsule, Rfl: 0    levoFLOXacin (LEVAQUIN) 250 MG tablet, Take 1 tablet (250 mg total) by mouth once daily., Disp: 10 tablet, Rfl: 0    Review of Systems   Constitutional: Negative for chills, fatigue, fever and unexpected weight change.   HENT: Negative for congestion (nasal congestion), ear pain, hearing loss (chronic), sinus pain and sore throat.    Eyes: Negative for pain and visual disturbance.   Respiratory: Negative for cough (HPI), shortness of breath and wheezing.    Cardiovascular: Negative for chest pain, palpitations and leg swelling.   Gastrointestinal: Negative for  abdominal pain, blood in stool, constipation, diarrhea, nausea and vomiting.   Endocrine: Negative for cold intolerance and heat intolerance.   Genitourinary: Negative for difficulty urinating, dysuria, frequency, pelvic pain and urgency.   Musculoskeletal: Negative for back pain, joint swelling and neck pain.        Chronic low back pain and osteoporosis   Skin: Negative for pallor and rash.   Neurological: Negative for dizziness, tremors, weakness, numbness and headaches.   Hematological: Does not bruise/bleed easily.   Psychiatric/Behavioral: Positive for confusion (dementia=). Negative for agitation, sleep disturbance and suicidal ideas.        Forgetful--hyperactive          Objective:      Vitals:    03/14/19 1135   BP: 138/70   Pulse: 72   Resp: (!) 24   Temp: 98 °F (36.7 °C)   TempSrc: Oral   SpO2: 95%   Weight: 50.4 kg (111 lb 1.6 oz)     Physical Exam   Constitutional: She is oriented to person, place, and time. She appears well-developed and well-nourished. She is cooperative.   HENT:   Head: Normocephalic and atraumatic.   Right Ear: Tympanic membrane normal.   Left Ear: Tympanic membrane normal.   Eyes: Conjunctivae, EOM and lids are normal. Right pupil is round and reactive. Left pupil is round and reactive.   Neck: Trachea normal and normal range of motion. Neck supple. No JVD present. Carotid bruit is not present. No thyromegaly present.   Cardiovascular: Normal rate, regular rhythm, S1 normal, S2 normal, normal heart sounds and intact distal pulses. Exam reveals no gallop and no friction rub.   No murmur heard.  Pulmonary/Chest: No respiratory distress. She has no wheezes. She has no rales.   Inspiratory and expiratory rhonchi without wheezes   Abdominal: Soft. Bowel sounds are normal. She exhibits no mass. There is no tenderness. There is no rigidity and no guarding.   Musculoskeletal: Normal range of motion. She exhibits no edema.   Neurological: She is alert and oriented to person, place, and  time.   Skin: Skin is warm and dry. Capillary refill takes less than 2 seconds. No lesion and no rash noted. There is cyanosis. Nails show no clubbing.   Psychiatric: She has a normal mood and affect. Her behavior is normal. Judgment and thought content normal.   Nursing note and vitals reviewed.        Assessment:       1. Persistent cough for 3 weeks or longer    2. Bronchitis    3. Interstitial lung disease         Plan:       Persistent cough for 3 weeks or longer  -     CT Chest Without Contrast; Future; Expected date: 03/14/2019    Bronchitis  -     CT Chest Without Contrast; Future; Expected date: 03/14/2019  -     levoFLOXacin (LEVAQUIN) 250 MG tablet; Take 1 tablet (250 mg total) by mouth once daily.  Dispense: 10 tablet; Refill: 0  -     benzonatate (TESSALON) 100 MG capsule; Take 1 capsule (100 mg total) by mouth 3 (three) times daily as needed for Cough.  Dispense: 30 capsule; Refill: 0  -     albuterol-ipratropium (DUO-NEB) 2.5 mg-0.5 mg/3 mL nebulizer solution; Take 3 mLs by nebulization every 6 (six) hours as needed for Wheezing. Rescue  Dispense: 1 Box; Refill: 1    Interstitial lung disease  -     CT Chest Without Contrast; Future; Expected date: 03/14/2019  -     levoFLOXacin (LEVAQUIN) 250 MG tablet; Take 1 tablet (250 mg total) by mouth once daily.  Dispense: 10 tablet; Refill: 0  -     benzonatate (TESSALON) 100 MG capsule; Take 1 capsule (100 mg total) by mouth 3 (three) times daily as needed for Cough.  Dispense: 30 capsule; Refill: 0  -     albuterol-ipratropium (DUO-NEB) 2.5 mg-0.5 mg/3 mL nebulizer solution; Take 3 mLs by nebulization every 6 (six) hours as needed for Wheezing. Rescue  Dispense: 1 Box; Refill: 1      Follow-up in about 2 weeks (around 3/28/2019).        3/14/2019 Marcia Calzada M.D.

## 2019-03-15 ENCOUNTER — TELEPHONE (OUTPATIENT)
Dept: FAMILY MEDICINE | Facility: CLINIC | Age: 84
End: 2019-03-15

## 2019-03-18 ENCOUNTER — TELEPHONE (OUTPATIENT)
Dept: FAMILY MEDICINE | Facility: CLINIC | Age: 84
End: 2019-03-18

## 2019-03-18 DIAGNOSIS — R23.3 EASY BRUISING: Primary | ICD-10-CM

## 2019-03-19 LAB
ALBUMIN SERPL-MCNC: 4.2 G/DL (ref 3.1–4.7)
ALP SERPL-CCNC: 85 IU/L (ref 40–104)
ALT (SGPT): 32 IU/L (ref 3–33)
APTT PPP: 29.8 SEC (ref 26.2–34.7)
AST SERPL-CCNC: 37 IU/L (ref 10–40)
BASOPHILS NFR BLD: 0 K/UL (ref 0–0.2)
BASOPHILS NFR BLD: 0.3 %
BILIRUB SERPL-MCNC: 0.5 MG/DL (ref 0.3–1)
BUN SERPL-MCNC: 18 MG/DL (ref 8–20)
CALCIUM SERPL-MCNC: 10.1 MG/DL (ref 7.7–10.4)
CHLORIDE: 101 MMOL/L (ref 98–110)
CO2 SERPL-SCNC: 29.1 MMOL/L (ref 22.8–31.6)
CREATININE: 0.94 MG/DL (ref 0.6–1.4)
CRP SERPL-MCNC: 0.06 MG/DL (ref 0–1.4)
EOSINOPHIL NFR BLD: 0.1 K/UL (ref 0–0.7)
EOSINOPHIL NFR BLD: 1.1 %
ERYTHROCYTE [DISTWIDTH] IN BLOOD BY AUTOMATED COUNT: 14.2 % (ref 11.7–14.9)
GLUCOSE: 86 MG/DL (ref 70–99)
GRAN #: 9.8 K/UL (ref 1.4–6.5)
GRAN%: 75.9 %
HBA1C MFR BLD: 5.7 % (ref 3.1–6.5)
HCT VFR BLD AUTO: 43.7 % (ref 36–48)
HGB BLD-MCNC: 14.2 G/DL (ref 12–15)
IMMATURE GRANS (ABS): 0.1 K/UL (ref 0–1)
IMMATURE GRANULOCYTES: 0.8 %
IMMATURE PLATELET FRACTION: 5.3 % (ref 0.5–7.5)
INR PPP: 1
LYMPH #: 1.6 K/UL (ref 1.2–3.4)
LYMPH%: 12 %
MCH RBC QN AUTO: 30.9 PG (ref 25–35)
MCHC RBC AUTO-ENTMCNC: 32.5 G/DL (ref 31–36)
MCV RBC AUTO: 95 FL (ref 79–98)
MONO #: 1.3 K/UL (ref 0.1–0.6)
MONO%: 9.9 %
NUCLEATED RBCS: 0 %
PLATELET # BLD AUTO: 257 K/UL (ref 140–440)
PMV BLD AUTO: 10.3 FL (ref 8.8–12.7)
POTASSIUM SERPL-SCNC: 4.4 MMOL/L (ref 3.5–5)
PROT SERPL-MCNC: 8 G/DL (ref 6–8.2)
PROTHROMBIN TIME: 12.9 SEC (ref 11.7–14)
RBC # BLD AUTO: 4.6 M/UL (ref 3.5–5.5)
SODIUM: 140 MMOL/L (ref 134–144)
VITAMIN D, 1,25 (OH)2: 25.7 NG/ML (ref 30–100)
WBC # BLD AUTO: 12.9 K/UL (ref 5–10)

## 2019-03-20 ENCOUNTER — TELEPHONE (OUTPATIENT)
Dept: FAMILY MEDICINE | Facility: CLINIC | Age: 84
End: 2019-03-20

## 2019-03-20 DIAGNOSIS — J47.9 BRONCHIECTASIS WITHOUT COMPLICATION: ICD-10-CM

## 2019-03-20 DIAGNOSIS — J84.10 PULMONARY FIBROSIS: Primary | ICD-10-CM

## 2019-03-20 NOTE — TELEPHONE ENCOUNTER
----- Message from Marcia Calzada MD sent at 3/20/2019 10:45 AM CDT -----  Please call the patient regarding her abnormal result.-stable changes of pulmonary fibrosis and bronchiectasis-we need to order a pneumatic vest for her

## 2019-03-21 ENCOUNTER — TELEPHONE (OUTPATIENT)
Dept: FAMILY MEDICINE | Facility: CLINIC | Age: 84
End: 2019-03-21

## 2019-03-21 DIAGNOSIS — J84.10 PULMONARY FIBROSIS: Primary | ICD-10-CM

## 2019-03-21 NOTE — TELEPHONE ENCOUNTER
----- Message from Marcia Calzada MD sent at 3/21/2019 12:47 PM CDT -----  Please call the patient regarding her abnormal result. To Levaquin we should probably give a course of doxycycline 100 mg a day so please pend for 10 days and tell the patient sees be on a probiotic or at least tell the daughter

## 2019-03-24 RX ORDER — DOXYCYCLINE 100 MG/1
100 CAPSULE ORAL 2 TIMES DAILY
Qty: 20 CAPSULE | Refills: 0 | Status: SHIPPED | OUTPATIENT
Start: 2019-03-24 | End: 2019-04-03

## 2019-03-27 ENCOUNTER — TELEPHONE (OUTPATIENT)
Dept: FAMILY MEDICINE | Facility: CLINIC | Age: 84
End: 2019-03-27

## 2019-03-27 NOTE — TELEPHONE ENCOUNTER
"Bob Soto came in requesting last office notes and CXR results for Pt to get Vest fir Airway clearance for home use. They were given the signed forms needed, JAKE, and CXR results. They need the last office note addended to state, "No skilled care giver for [facility] CPT. CT on 3/19/19 confirms Bronchiectasis and cough for over 6 months." Then fax over to their office at [phone].     "

## 2019-04-22 DIAGNOSIS — M81.0 AGE-RELATED OSTEOPOROSIS WITHOUT CURRENT PATHOLOGICAL FRACTURE: ICD-10-CM

## 2019-04-22 DIAGNOSIS — R41.3 MEMORY LOSS: ICD-10-CM

## 2019-04-22 DIAGNOSIS — I70.1 RAS (RENAL ARTERY STENOSIS): ICD-10-CM

## 2019-04-22 DIAGNOSIS — I10 ESSENTIAL HYPERTENSION: ICD-10-CM

## 2019-04-22 RX ORDER — DILTIAZEM HYDROCHLORIDE 360 MG/1
CAPSULE, EXTENDED RELEASE ORAL
Qty: 90 CAPSULE | Refills: 1 | Status: SHIPPED | OUTPATIENT
Start: 2019-04-22 | End: 2019-08-13 | Stop reason: SDUPTHER

## 2019-04-22 RX ORDER — CILOSTAZOL 50 MG/1
TABLET ORAL
Qty: 360 TABLET | Refills: 1 | Status: SHIPPED | OUTPATIENT
Start: 2019-04-22 | End: 2019-08-13 | Stop reason: SDUPTHER

## 2019-04-22 RX ORDER — CALCITRIOL 0.25 UG/1
CAPSULE ORAL
Qty: 90 CAPSULE | Refills: 1 | Status: SHIPPED | OUTPATIENT
Start: 2019-04-22 | End: 2019-08-13 | Stop reason: SDUPTHER

## 2019-04-30 ENCOUNTER — OFFICE VISIT (OUTPATIENT)
Dept: FAMILY MEDICINE | Facility: CLINIC | Age: 84
End: 2019-04-30
Payer: MEDICARE

## 2019-04-30 VITALS
RESPIRATION RATE: 12 BRPM | OXYGEN SATURATION: 96 % | HEART RATE: 58 BPM | HEIGHT: 62 IN | DIASTOLIC BLOOD PRESSURE: 68 MMHG | WEIGHT: 112 LBS | SYSTOLIC BLOOD PRESSURE: 132 MMHG | TEMPERATURE: 98 F | BODY MASS INDEX: 20.61 KG/M2

## 2019-04-30 DIAGNOSIS — J84.9 INTERSTITIAL LUNG DISEASE: ICD-10-CM

## 2019-04-30 DIAGNOSIS — R41.3 MEMORY LOSS: ICD-10-CM

## 2019-04-30 DIAGNOSIS — E55.9 VITAMIN D DEFICIENCY: ICD-10-CM

## 2019-04-30 DIAGNOSIS — J40 BRONCHITIS: ICD-10-CM

## 2019-04-30 DIAGNOSIS — N39.0 URINARY TRACT INFECTION WITHOUT HEMATURIA, SITE UNSPECIFIED: ICD-10-CM

## 2019-04-30 DIAGNOSIS — K21.9 GASTROESOPHAGEAL REFLUX DISEASE, ESOPHAGITIS PRESENCE NOT SPECIFIED: ICD-10-CM

## 2019-04-30 DIAGNOSIS — E78.00 PURE HYPERCHOLESTEROLEMIA: ICD-10-CM

## 2019-04-30 DIAGNOSIS — M81.0 AGE-RELATED OSTEOPOROSIS WITHOUT CURRENT PATHOLOGICAL FRACTURE: ICD-10-CM

## 2019-04-30 DIAGNOSIS — J47.9 BRONCHIECTASIS WITHOUT COMPLICATION: Primary | ICD-10-CM

## 2019-04-30 PROCEDURE — 1101F PT FALLS ASSESS-DOCD LE1/YR: CPT | Mod: ,,, | Performed by: INTERNAL MEDICINE

## 2019-04-30 PROCEDURE — 3078F DIAST BP <80 MM HG: CPT | Mod: ,,, | Performed by: INTERNAL MEDICINE

## 2019-04-30 PROCEDURE — 99214 OFFICE O/P EST MOD 30 MIN: CPT | Mod: ,,, | Performed by: INTERNAL MEDICINE

## 2019-04-30 PROCEDURE — 99214 PR OFFICE/OUTPT VISIT, EST, LEVL IV, 30-39 MIN: ICD-10-PCS | Mod: ,,, | Performed by: INTERNAL MEDICINE

## 2019-04-30 PROCEDURE — 3075F SYST BP GE 130 - 139MM HG: CPT | Mod: ,,, | Performed by: INTERNAL MEDICINE

## 2019-04-30 PROCEDURE — 3075F PR MOST RECENT SYSTOLIC BLOOD PRESS GE 130-139MM HG: ICD-10-PCS | Mod: ,,, | Performed by: INTERNAL MEDICINE

## 2019-04-30 PROCEDURE — 3078F PR MOST RECENT DIASTOLIC BLOOD PRESSURE < 80 MM HG: ICD-10-PCS | Mod: ,,, | Performed by: INTERNAL MEDICINE

## 2019-04-30 PROCEDURE — 1101F PR PT FALLS ASSESS DOC 0-1 FALLS W/OUT INJ PAST YR: ICD-10-PCS | Mod: ,,, | Performed by: INTERNAL MEDICINE

## 2019-04-30 RX ORDER — DONEPEZIL HYDROCHLORIDE 10 MG/1
10 TABLET, FILM COATED ORAL DAILY
Qty: 90 TABLET | Refills: 1 | Status: SHIPPED | OUTPATIENT
Start: 2019-04-30 | End: 2019-08-13 | Stop reason: SDUPTHER

## 2019-04-30 RX ORDER — ATORVASTATIN CALCIUM 10 MG/1
10 TABLET, FILM COATED ORAL DAILY
Qty: 90 TABLET | Refills: 1 | Status: SHIPPED | OUTPATIENT
Start: 2019-04-30 | End: 2019-08-13 | Stop reason: SDUPTHER

## 2019-04-30 RX ORDER — OMEPRAZOLE 40 MG/1
40 CAPSULE, DELAYED RELEASE ORAL DAILY
Qty: 90 CAPSULE | Refills: 1 | Status: SHIPPED | OUTPATIENT
Start: 2019-04-30 | End: 2019-08-13 | Stop reason: SDUPTHER

## 2019-04-30 RX ORDER — ASPIRIN 325 MG
50000 TABLET, DELAYED RELEASE (ENTERIC COATED) ORAL
Qty: 12 CAPSULE | Refills: 0 | Status: SHIPPED | OUTPATIENT
Start: 2019-04-30 | End: 2019-08-13 | Stop reason: SDUPTHER

## 2019-04-30 RX ORDER — ATOMOXETINE 10 MG/1
10 CAPSULE ORAL DAILY
Qty: 30 CAPSULE | Refills: 0 | Status: CANCELLED | OUTPATIENT
Start: 2019-04-30 | End: 2019-05-30

## 2019-04-30 RX ORDER — MEMANTINE HYDROCHLORIDE 10 MG/1
10 TABLET ORAL 2 TIMES DAILY
Qty: 90 TABLET | Refills: 1 | Status: SHIPPED | OUTPATIENT
Start: 2019-04-30 | End: 2019-07-29 | Stop reason: SDUPTHER

## 2019-04-30 RX ORDER — IPRATROPIUM BROMIDE AND ALBUTEROL SULFATE 2.5; .5 MG/3ML; MG/3ML
3 SOLUTION RESPIRATORY (INHALATION) EVERY 6 HOURS PRN
Qty: 1 BOX | Refills: 1 | Status: SHIPPED | OUTPATIENT
Start: 2019-04-30 | End: 2019-08-13 | Stop reason: SDUPTHER

## 2019-04-30 RX ORDER — ALENDRONATE SODIUM 70 MG/1
70 TABLET ORAL
Qty: 12 TABLET | Refills: 1 | Status: SHIPPED | OUTPATIENT
Start: 2019-04-30 | End: 2019-08-13 | Stop reason: SDUPTHER

## 2019-04-30 NOTE — PROGRESS NOTES
"  SUBJECTIVE:    Patient ID: Jessica Naidu is a 84 y.o. female.    Chief Complaint: Medication Refill; Memory Loss; and Follow-up    HPI     Patient comes in for follow-up of cough with underlying interstitial lung disease and bronchiectasis. Manic vest was ordered last office visit    Her A1c was 5.7. Glucose was 86. BUN 18, creatinine 0.94. WBC was 12,900 hemoglobin and hematocrit 14.2 and 43.7 vitamin D was low at 25.7.    Pt saw  for follow-up Pseudomonas UTI-    She is now wearing the pneumatic vest-and she is bringing up lots of mucous-breathing is better but she describes "rattling"    Her major issue is her memory of which she is not aware-she just writes it off and laughs about it-strattera is not effective at all with her hyper states-    25 min visit the majority was involved with discussion of her state of hyperactivity, poor hearing, memory loss, and habit of not listening-very hyper lady who did not respond to low doses of strattera and I did not wish to increase the dosage but she talks so much she doesn't listen to what is being tod to her-and, her dementia is such that she doesn't remember much, and her hearing so affected she cannot hear half of the conversation-daughter assumes care of her and it is a tiring effort to stay up with her and to take care of her-discussed some memory supps-in the past she hasnt done well with prescription attempts.    Orders Only on 03/19/2019   Component Date Value Ref Range Status    Hemoglobin A1C 03/19/2019 5.7  3.1 - 6.5 % Final   Orders Only on 03/19/2019   Component Date Value Ref Range Status    PT 03/19/2019 12.9  11.7 - 14.0 sec Final    INR 03/19/2019 1.0   Final    aPTT 03/19/2019 29.8  26.2 - 34.7 sec Final    Glucose 03/19/2019 86  70 - 99 mg/dL Final    BUN, Bld 03/19/2019 18  8 - 20 mg/dL Final    Creatinine 03/19/2019 0.94  0.60 - 1.40 mg/dL Final    Calcium 03/19/2019 10.1  7.7 - 10.4 mg/dL Final    Sodium 03/19/2019 140  134 - 144 " mmol/L Final    Potassium 03/19/2019 4.4  3.5 - 5.0 mmol/L Final    Chloride 03/19/2019 101  98 - 110 mmol/L Final    CO2 03/19/2019 29.1  22.8 - 31.6 mmol/L Final    Albumin 03/19/2019 4.2  3.1 - 4.7 g/dL Final    Total Bilirubin 03/19/2019 0.5  0.3 - 1.0 mg/dL Final    Alkaline Phosphatase 03/19/2019 85  40 - 104 IU/L Final    Total Protein 03/19/2019 8.0  6.0 - 8.2 g/dL Final    ALT (SGPT) 03/19/2019 32  3 - 33 IU/L Final    AST 03/19/2019 37  10 - 40 IU/L Final    CRP 03/19/2019 0.06  0.00 - 1.40 mg/dL Final    WBC 03/19/2019 12.9* 5.0 - 10.0 K/uL Final    RBC 03/19/2019 4.60  3.50 - 5.50 M/uL Final    Hemoglobin 03/19/2019 14.2  12.0 - 15.0 g/dL Final    Hematocrit 03/19/2019 43.7  36.0 - 48.0 % Final    Mean Corpuscular Volume 03/19/2019 95.0  79.0 - 98.0 fL Final    Mean Corpuscular Hemoglobin 03/19/2019 30.9  25.0 - 35.0 pg Final    Mean Corpuscular Hemoglobin Conc 03/19/2019 32.5  31.0 - 36.0 g/dL Final    RDW 03/19/2019 14.2  11.7 - 14.9 % Final    Platelets 03/19/2019 257  140 - 440 K/uL Final    MPV 03/19/2019 10.3  8.8 - 12.7 fL Final    Gran% 03/19/2019 75.9  % Final    Lymph% 03/19/2019 12.0  % Final    Mono% 03/19/2019 9.9  % Final    Eosinophil% 03/19/2019 1.1  % Final    Basophil% 03/19/2019 0.3  % Final    Gran # (ANC) 03/19/2019 9.8* 1.4 - 6.5 K/uL Final    Lymph # 03/19/2019 1.6  1.2 - 3.4 K/uL Final    Mono # 03/19/2019 1.3* 0.1 - 0.6 K/uL Final    Eos # 03/19/2019 0.1  0.0 - 0.7 K/uL Final    Baso # 03/19/2019 0.0  0.0 - 0.2 K/uL Final    Immature Grans (Abs) 03/19/2019 0.1  0.0 - 1.0 K/uL Final    Immature Granulocytes 03/19/2019 0.8  % Final    nRBC% 03/19/2019 0  % Final    Immature Platelet Fraction 03/19/2019 5.3  0.5 - 7.5 % Final    Vitamin D, 1,25 (OH)2 03/19/2019 25.70* 30.00 - 100.00 ng/mL Final   Office Visit on 11/20/2018   Component Date Value Ref Range Status    Color, UA 11/20/2018 dark yellow   Final    Spec Grav UA 11/20/2018 1.015   Final     pH, UA 11/20/2018 6   Final    WBC, UA 11/20/2018 trace   Final    Nitrite, UA 11/20/2018 positive   Final    Protein 11/20/2018 1+   Final    Glucose, UA 11/20/2018 norm   Final    Ketones, UA 11/20/2018 neg   Final    Urobilinogen, UA 11/20/2018 norm   Final    Bilirubin 11/20/2018 neg   Final    Blood, UA 11/20/2018 trace   Final    Urine Culture, Routine 11/20/2018    Final                    Value:PSEUDOMONAS AERUGINOSA  >100,000 cfu/ml         Past Medical History:   Diagnosis Date    Arthritis     Hearing loss, conductive, bilateral     High cholesterol     History of shingles     vaginal    Hypertension     Kidney artery constriction     has renal artery stent    Lumbar radiculopathy     Osteoporosis     PONV (postoperative nausea and vomiting)     PVD (peripheral vascular disease)     CHASTITY (renal artery stenosis) 11/26/2018    Stroke     of eye vasculature     Past Surgical History:   Procedure Laterality Date    ANTERIOR CERVICAL DISCECTOMY W/ FUSION      APPENDECTOMY      BLADDER SUSPENSION      BLOCK, NERVE, FACET JOINT, LUMBAR, MEDIAL BRANCH Right 10/25/2012    Performed by [facility] at [facility]    CERVICAL SPINE SURGERY      COLONOSCOPY      COSMETIC SURGERY      EYE SURGERY      HYSTERECTOMY      RADIOFREQUENCY ABLATION, NERVE, SPINAL, LUMBAR, MEDIAL BRANCH, 1 LEVEL Right 11/8/2012    Performed by [facility]j Timmons MD at [facility] OR     Family History   Problem Relation Age of Onset    Arthritis Mother     Hearing loss Mother     Heart disease Mother     Hypertension Mother     Kidney disease Mother     Stroke Mother     Heart disease Father        Marital Status: Single  Alcohol History:  reports that she does not drink alcohol.  Tobacco History:  reports that she has quit smoking. Her smoking use included cigarettes. She smoked 1.00 pack per day. She has never used smokeless tobacco.  Drug History:  reports that she does not use drugs.    Review of  patient's allergies indicates:   Allergen Reactions    Adhesive     Arb-angiotensin receptor antagonist     Atacand [candesartan]     Bactrim [sulfamethoxazole-trimethoprim]     Codeine Hives    Penicillins Hives    Trental [pentoxifylline]        Current Outpatient Medications:     albuterol-ipratropium (DUO-NEB) 2.5 mg-0.5 mg/3 mL nebulizer solution, Take 3 mLs by nebulization every 6 (six) hours as needed for Wheezing. Rescue, Disp: 1 Box, Rfl: 1    alendronate (FOSAMAX) 70 MG tablet, Take 1 tablet (70 mg total) by mouth every 7 days., Disp: 12 tablet, Rfl: 1    atomoxetine (STRATTERA) 10 MG capsule, Take 1 capsule (10 mg total) by mouth once daily., Disp: 30 capsule, Rfl: 0    atorvastatin (LIPITOR) 10 MG tablet, Take 1 tablet (10 mg total) by mouth once daily., Disp: 90 tablet, Rfl: 1    brimonidine 0.1% (ALPHAGAN P) 0.1 % Drop, Place 1 drop into both eyes 3 (three) times daily.  , Disp: , Rfl:     calcitRIOL (ROCALTROL) 0.25 MCG Cap, TAKE 1 CAPSULE ONE TIME DAILY, Disp: 90 capsule, Rfl: 1    cilostazol (PLETAL) 50 MG Tab, TAKE 2 TABLETS TWICE DAILY, Disp: 360 tablet, Rfl: 1    diltiaZEM (CARDIZEM CD) 360 MG 24 hr capsule, TAKE 1 CAPSULE ONE TIME DAILY, Disp: 90 capsule, Rfl: 1    donepezil (ARICEPT) 10 MG tablet, Take 1 tablet (10 mg total) by mouth once daily., Disp: 90 tablet, Rfl: 1    inhalation spacing device (BREATHERITE MDI SPACER), Use as directed for inhalation., Disp: 10 Device, Rfl: 0    memantine (NAMENDA) 10 MG Tab, Take 1 tablet (10 mg total) by mouth 2 (two) times daily., Disp: 90 tablet, Rfl: 1    ofloxacin (OCUFLOX) 0.3 % ophthalmic solution, , Disp: , Rfl:     omeprazole (PRILOSEC) 40 MG capsule, Take 1 capsule (40 mg total) by mouth once daily., Disp: 90 capsule, Rfl: 1    trimethoprim (TRIMPEX) 100 mg Tab, Take 1 tablet (100 mg total) by mouth every evening., Disp: 42 tablet, Rfl: 6    cholecalciferol, vitamin D3, 50,000 unit capsule, Take 1 capsule (50,000 Units  "total) by mouth every 7 days., Disp: 12 capsule, Rfl: 0    Review of Systems   Constitutional: Negative for appetite change, chills, diaphoresis, fatigue, fever and unexpected weight change.   HENT: Negative for congestion, ear pain, hearing loss, nosebleeds, postnasal drip, sinus pressure, sinus pain, sneezing, sore throat, tinnitus, trouble swallowing and voice change.    Eyes: Negative for photophobia, pain, itching and visual disturbance.   Respiratory: Negative for apnea, cough, chest tightness, shortness of breath (climbing steps -she has to stop and pause to complete steps), wheezing and stridor.    Cardiovascular: Negative for chest pain, palpitations and leg swelling.   Gastrointestinal: Negative for abdominal distention, abdominal pain, blood in stool, constipation, diarrhea, nausea and vomiting.   Endocrine: Negative for cold intolerance, heat intolerance, polydipsia and polyuria.   Genitourinary: Negative for difficulty urinating, dyspareunia, dysuria, flank pain, frequency, hematuria, menstrual problem, pelvic pain, urgency, vaginal discharge and vaginal pain.        Bladder leakage and urgency   Musculoskeletal: Negative for arthralgias, back pain (lower right lateral pain lateral to the SI joint), joint swelling, myalgias, neck pain and neck stiffness.   Skin: Negative for pallor.   Allergic/Immunologic: Negative for environmental allergies and food allergies.   Neurological: Positive for headaches (rare headache ). Negative for dizziness, tremors, speech difficulty, weakness, light-headedness and numbness.   Hematological: Does not bruise/bleed easily.   Psychiatric/Behavioral: Negative for agitation, confusion, decreased concentration, sleep disturbance and suicidal ideas. The patient is not nervous/anxious (chronic hyper state).           Objective:      Vitals:    04/30/19 1349   BP: 132/68   Pulse: (!) 58   Resp: 12   Temp: 98 °F (36.7 °C)   SpO2: 96%   Weight: 50.8 kg (112 lb)   Height: 5' 2" " (1.575 m)     Physical Exam   Constitutional: She is oriented to person, place, and time. She appears well-developed and well-nourished. She is cooperative. No distress.   HENT:   Head: Normocephalic and atraumatic.   Right Ear: Tympanic membrane normal.   Left Ear: Tympanic membrane normal.   Nose: Nose normal.   Mouth/Throat: Uvula is midline and mucous membranes are normal.   Eyes: Pupils are equal, round, and reactive to light. Conjunctivae and EOM are normal. Right eye exhibits no discharge. Left eye exhibits no discharge. No scleral icterus. Right pupil is round and reactive. Left pupil is round and reactive.   Neck: Trachea normal and normal range of motion. Neck supple. No JVD present. Carotid bruit is not present. No thyromegaly present.   Cardiovascular: Normal rate, regular rhythm and intact distal pulses. Exam reveals no gallop and no friction rub.   No murmur heard.  Pulmonary/Chest: Effort normal and breath sounds normal. No respiratory distress. She has no wheezes. She has no rales.   Abdominal: Soft. Bowel sounds are normal. She exhibits no distension and no mass. There is no tenderness. There is no guarding. No hernia.   Musculoskeletal: Normal range of motion. She exhibits no edema.   Neurological: She is alert and oriented to person, place, and time. She has normal strength.   Skin: Skin is warm and dry. Capillary refill takes less than 2 seconds. No lesion and no rash noted. No cyanosis. Nails show no clubbing.   Psychiatric: She has a normal mood and affect. Her speech is normal and behavior is normal. Judgment and thought content normal.   Nursing note and vitals reviewed.        Assessment:       1. Bronchiectasis without complication    2. Interstitial lung disease    3. Vitamin D deficiency    4. Bronchitis    5. Age-related osteoporosis without current pathological fracture    6. Pure hypercholesterolemia    7. Memory loss    8. Gastroesophageal reflux disease, esophagitis presence not  specified    9. Urinary tract infection without hematuria, site unspecified         Plan:       Bronchiectasis without complication        -     Improving with pneumatic vest    Interstitial lung disease        -     stable    Vitamin D deficiency        -     Continue supplementation and recheck vitamin D level    Bronchitis        -     resolved    Age-related osteoporosis without current pathological fracture        -     Continue alendronate    Pure hypercholesterolemia        -     atorvostatin    Memory loss        --    Cerenyx 2 daily    Gastroesophageal reflux disease, esophagitis presence not specified        -     Continue as is    Urinary tract infection without hematuria, site unspecified        -     Pseudomonas -will recheck culture      Follow up in about 3 months (around 7/30/2019) for lungs, memory.        5/3/2019 Marcia Calzada M.D.

## 2019-05-03 PROBLEM — I70.1 RAS (RENAL ARTERY STENOSIS): Status: RESOLVED | Noted: 2018-11-26 | Resolved: 2019-05-03

## 2019-05-07 ENCOUNTER — TELEPHONE (OUTPATIENT)
Dept: FAMILY MEDICINE | Facility: CLINIC | Age: 84
End: 2019-05-07

## 2019-05-07 NOTE — TELEPHONE ENCOUNTER
Pharmacy requesting clarification on Vitamin D3 50,000 units weekly and calcitriol 0.25mcg one po daily. MD sent clarification

## 2019-07-29 DIAGNOSIS — R41.3 MEMORY LOSS: ICD-10-CM

## 2019-07-30 RX ORDER — MEMANTINE HYDROCHLORIDE 10 MG/1
TABLET ORAL
Qty: 180 TABLET | Refills: 1 | Status: SHIPPED | OUTPATIENT
Start: 2019-07-30 | End: 2019-11-20 | Stop reason: SDUPTHER

## 2019-07-31 RX ORDER — TRIMETHOPRIM 100 MG/1
100 TABLET ORAL NIGHTLY
Qty: 42 TABLET | Refills: 6 | Status: SHIPPED | OUTPATIENT
Start: 2019-07-31

## 2019-08-13 ENCOUNTER — OFFICE VISIT (OUTPATIENT)
Dept: FAMILY MEDICINE | Facility: CLINIC | Age: 84
End: 2019-08-13
Payer: MEDICARE

## 2019-08-13 VITALS
SYSTOLIC BLOOD PRESSURE: 126 MMHG | WEIGHT: 118 LBS | BODY MASS INDEX: 21.71 KG/M2 | DIASTOLIC BLOOD PRESSURE: 60 MMHG | HEIGHT: 62 IN | TEMPERATURE: 98 F | OXYGEN SATURATION: 96 % | HEART RATE: 68 BPM | RESPIRATION RATE: 16 BRPM

## 2019-08-13 DIAGNOSIS — E78.00 PURE HYPERCHOLESTEROLEMIA: ICD-10-CM

## 2019-08-13 DIAGNOSIS — K21.9 GASTROESOPHAGEAL REFLUX DISEASE, ESOPHAGITIS PRESENCE NOT SPECIFIED: ICD-10-CM

## 2019-08-13 DIAGNOSIS — J84.9 INTERSTITIAL LUNG DISEASE: Primary | ICD-10-CM

## 2019-08-13 DIAGNOSIS — E55.9 VITAMIN D DEFICIENCY: ICD-10-CM

## 2019-08-13 DIAGNOSIS — M81.0 AGE-RELATED OSTEOPOROSIS WITHOUT CURRENT PATHOLOGICAL FRACTURE: ICD-10-CM

## 2019-08-13 DIAGNOSIS — I10 ESSENTIAL HYPERTENSION: ICD-10-CM

## 2019-08-13 DIAGNOSIS — R41.3 MEMORY LOSS: ICD-10-CM

## 2019-08-13 DIAGNOSIS — I70.1 RAS (RENAL ARTERY STENOSIS): ICD-10-CM

## 2019-08-13 DIAGNOSIS — J40 BRONCHITIS: ICD-10-CM

## 2019-08-13 DIAGNOSIS — Z23 NEED FOR SHINGLES VACCINE: ICD-10-CM

## 2019-08-13 PROCEDURE — 3074F SYST BP LT 130 MM HG: CPT | Mod: S$GLB,,, | Performed by: INTERNAL MEDICINE

## 2019-08-13 PROCEDURE — 99214 OFFICE O/P EST MOD 30 MIN: CPT | Mod: S$GLB,,, | Performed by: INTERNAL MEDICINE

## 2019-08-13 PROCEDURE — 3078F DIAST BP <80 MM HG: CPT | Mod: S$GLB,,, | Performed by: INTERNAL MEDICINE

## 2019-08-13 PROCEDURE — 1101F PR PT FALLS ASSESS DOC 0-1 FALLS W/OUT INJ PAST YR: ICD-10-PCS | Mod: S$GLB,,, | Performed by: INTERNAL MEDICINE

## 2019-08-13 PROCEDURE — 3074F PR MOST RECENT SYSTOLIC BLOOD PRESSURE < 130 MM HG: ICD-10-PCS | Mod: S$GLB,,, | Performed by: INTERNAL MEDICINE

## 2019-08-13 PROCEDURE — 99999 PR PBB SHADOW E&M-EST. PATIENT-LVL IV: CPT | Mod: PBBFAC,,, | Performed by: INTERNAL MEDICINE

## 2019-08-13 PROCEDURE — 3078F PR MOST RECENT DIASTOLIC BLOOD PRESSURE < 80 MM HG: ICD-10-PCS | Mod: S$GLB,,, | Performed by: INTERNAL MEDICINE

## 2019-08-13 PROCEDURE — 99999 PR PBB SHADOW E&M-EST. PATIENT-LVL IV: ICD-10-PCS | Mod: PBBFAC,,, | Performed by: INTERNAL MEDICINE

## 2019-08-13 PROCEDURE — 1101F PT FALLS ASSESS-DOCD LE1/YR: CPT | Mod: S$GLB,,, | Performed by: INTERNAL MEDICINE

## 2019-08-13 PROCEDURE — 99214 PR OFFICE/OUTPT VISIT, EST, LEVL IV, 30-39 MIN: ICD-10-PCS | Mod: S$GLB,,, | Performed by: INTERNAL MEDICINE

## 2019-08-13 RX ORDER — BRIMONIDINE TARTRATE 1 MG/ML
1 SOLUTION/ DROPS OPHTHALMIC 3 TIMES DAILY
Qty: 15 ML | Refills: 1 | Status: SHIPPED | OUTPATIENT
Start: 2019-08-13

## 2019-08-13 RX ORDER — OMEPRAZOLE 40 MG/1
40 CAPSULE, DELAYED RELEASE ORAL DAILY
Qty: 90 CAPSULE | Refills: 1 | Status: SHIPPED | OUTPATIENT
Start: 2019-08-13 | End: 2019-11-20 | Stop reason: SDUPTHER

## 2019-08-13 RX ORDER — IPRATROPIUM BROMIDE AND ALBUTEROL SULFATE 2.5; .5 MG/3ML; MG/3ML
3 SOLUTION RESPIRATORY (INHALATION) EVERY 6 HOURS PRN
Qty: 1 BOX | Refills: 1 | Status: SHIPPED | OUTPATIENT
Start: 2019-08-13 | End: 2019-11-20

## 2019-08-13 RX ORDER — ASPIRIN 325 MG
50000 TABLET, DELAYED RELEASE (ENTERIC COATED) ORAL
Qty: 12 CAPSULE | Refills: 0 | Status: SHIPPED | OUTPATIENT
Start: 2019-08-13 | End: 2019-11-20 | Stop reason: SDUPTHER

## 2019-08-13 RX ORDER — CALCITRIOL 0.25 UG/1
0.25 CAPSULE ORAL DAILY
Qty: 90 CAPSULE | Refills: 1 | Status: SHIPPED | OUTPATIENT
Start: 2019-08-13 | End: 2019-10-29 | Stop reason: SDUPTHER

## 2019-08-13 RX ORDER — DILTIAZEM HYDROCHLORIDE 360 MG/1
360 CAPSULE, EXTENDED RELEASE ORAL DAILY
Qty: 90 CAPSULE | Refills: 1 | Status: SHIPPED | OUTPATIENT
Start: 2019-08-13 | End: 2019-11-20 | Stop reason: SDUPTHER

## 2019-08-13 RX ORDER — ALENDRONATE SODIUM 70 MG/1
70 TABLET ORAL
Qty: 12 TABLET | Refills: 1 | Status: SHIPPED | OUTPATIENT
Start: 2019-08-13 | End: 2019-11-20 | Stop reason: SDUPTHER

## 2019-08-13 RX ORDER — DONEPEZIL HYDROCHLORIDE 10 MG/1
10 TABLET, FILM COATED ORAL DAILY
Qty: 90 TABLET | Refills: 1 | Status: SHIPPED | OUTPATIENT
Start: 2019-08-13 | End: 2019-11-20 | Stop reason: SDUPTHER

## 2019-08-13 RX ORDER — CILOSTAZOL 50 MG/1
100 TABLET ORAL 2 TIMES DAILY
Qty: 360 TABLET | Refills: 1 | Status: SHIPPED | OUTPATIENT
Start: 2019-08-13 | End: 2019-11-20 | Stop reason: SDUPTHER

## 2019-08-13 RX ORDER — ATORVASTATIN CALCIUM 10 MG/1
10 TABLET, FILM COATED ORAL DAILY
Qty: 90 TABLET | Refills: 1 | Status: SHIPPED | OUTPATIENT
Start: 2019-08-13 | End: 2019-11-20 | Stop reason: SDUPTHER

## 2019-08-13 NOTE — PROGRESS NOTES
"  SUBJECTIVE:    Patient ID: Jessica Naidu is a 84 y.o. female.    Chief Complaint: Medication Refill; Memory Loss; and Follow-up    HPI     Patient comes in for reevaluation. Her vitamin D was low on last evaluation (25) and is being repeated at this time. Because of her extreme hyperactivity, we placed her on a minimal dose of Strattera to see if it affected her in any way.Dtr didn't really see a difference but wants to try another dose-    Pt is seeing pain management for injections for lumbar pain-HHA is coming for treatment of pressure ulcer which is of concern to the daughter.    A1C 5.7    Pt says she feels "fine"        Orders Only on 03/19/2019   Component Date Value Ref Range Status    Hemoglobin A1C 03/19/2019 5.7  3.1 - 6.5 % Final   Orders Only on 03/19/2019   Component Date Value Ref Range Status    PT 03/19/2019 12.9  11.7 - 14.0 sec Final    INR 03/19/2019 1.0   Final    aPTT 03/19/2019 29.8  26.2 - 34.7 sec Final    Glucose 03/19/2019 86  70 - 99 mg/dL Final    BUN, Bld 03/19/2019 18  8 - 20 mg/dL Final    Creatinine 03/19/2019 0.94  0.60 - 1.40 mg/dL Final    Calcium 03/19/2019 10.1  7.7 - 10.4 mg/dL Final    Sodium 03/19/2019 140  134 - 144 mmol/L Final    Potassium 03/19/2019 4.4  3.5 - 5.0 mmol/L Final    Chloride 03/19/2019 101  98 - 110 mmol/L Final    CO2 03/19/2019 29.1  22.8 - 31.6 mmol/L Final    Albumin 03/19/2019 4.2  3.1 - 4.7 g/dL Final    Total Bilirubin 03/19/2019 0.5  0.3 - 1.0 mg/dL Final    Alkaline Phosphatase 03/19/2019 85  40 - 104 IU/L Final    Total Protein 03/19/2019 8.0  6.0 - 8.2 g/dL Final    ALT (SGPT) 03/19/2019 32  3 - 33 IU/L Final    AST 03/19/2019 37  10 - 40 IU/L Final    CRP 03/19/2019 0.06  0.00 - 1.40 mg/dL Final    WBC 03/19/2019 12.9* 5.0 - 10.0 K/uL Final    RBC 03/19/2019 4.60  3.50 - 5.50 M/uL Final    Hemoglobin 03/19/2019 14.2  12.0 - 15.0 g/dL Final    Hematocrit 03/19/2019 43.7  36.0 - 48.0 % Final    Mean Corpuscular Volume " 03/19/2019 95.0  79.0 - 98.0 fL Final    Mean Corpuscular Hemoglobin 03/19/2019 30.9  25.0 - 35.0 pg Final    Mean Corpuscular Hemoglobin Conc 03/19/2019 32.5  31.0 - 36.0 g/dL Final    RDW 03/19/2019 14.2  11.7 - 14.9 % Final    Platelets 03/19/2019 257  140 - 440 K/uL Final    MPV 03/19/2019 10.3  8.8 - 12.7 fL Final    Gran% 03/19/2019 75.9  % Final    Lymph% 03/19/2019 12.0  % Final    Mono% 03/19/2019 9.9  % Final    Eosinophil% 03/19/2019 1.1  % Final    Basophil% 03/19/2019 0.3  % Final    Gran # (ANC) 03/19/2019 9.8* 1.4 - 6.5 K/uL Final    Lymph # 03/19/2019 1.6  1.2 - 3.4 K/uL Final    Mono # 03/19/2019 1.3* 0.1 - 0.6 K/uL Final    Eos # 03/19/2019 0.1  0.0 - 0.7 K/uL Final    Baso # 03/19/2019 0.0  0.0 - 0.2 K/uL Final    Immature Grans (Abs) 03/19/2019 0.1  0.0 - 1.0 K/uL Final    Immature Granulocytes 03/19/2019 0.8  % Final    nRBC% 03/19/2019 0  % Final    Immature Platelet Fraction 03/19/2019 5.3  0.5 - 7.5 % Final    Vitamin D, 1,25 (OH)2 03/19/2019 25.70* 30.00 - 100.00 ng/mL Final       Past Medical History:   Diagnosis Date    Arthritis     Hearing loss, conductive, bilateral     High cholesterol     History of shingles     vaginal    Hypertension     Kidney artery constriction     has renal artery stent    Lumbar radiculopathy     Osteoporosis     PONV (postoperative nausea and vomiting)     PVD (peripheral vascular disease)     CAHSTITY (renal artery stenosis) 11/26/2018    Stroke     of eye vasculature     Past Surgical History:   Procedure Laterality Date    ANTERIOR CERVICAL DISCECTOMY W/ FUSION      APPENDECTOMY      BLADDER SUSPENSION      BLOCK, NERVE, FACET JOINT, LUMBAR, MEDIAL BRANCH Right 10/25/2012    Performed by Jj Timmons [facility] at [facility] OR    CERVICAL SPINE SURGERY      COLONOSCOPY      COSMETIC SURGERY      EYE SURGERY      HYSTERECTOMY      RADIOFREQUENCY ABLATION, NERVE, SPINAL, LUMBAR, MEDIAL BRANCH, 1 LEVEL Right 11/8/2012     Performed by Jj Timmons MD at [facility] OR     Family History   Problem Relation Age of Onset    Arthritis Mother     Hearing loss Mother     Heart disease Mother     Hypertension Mother     Kidney disease Mother     Stroke Mother     Heart disease Father        Marital Status: Single  Alcohol History:  reports that she does not drink alcohol.  Tobacco History:  reports that she has quit smoking. Her smoking use included cigarettes. She smoked 1.00 pack per day. She has never used smokeless tobacco.  Drug History:  reports that she does not use drugs.    Review of patient's allergies indicates:   Allergen Reactions    Adhesive     Arb-angiotensin receptor antagonist     Atacand [candesartan]     Bactrim [sulfamethoxazole-trimethoprim]     Codeine Hives    Penicillins Hives    Trental [pentoxifylline]        Current Outpatient Medications:     albuterol-ipratropium (DUO-NEB) 2.5 mg-0.5 mg/3 mL nebulizer solution, Take 3 mLs by nebulization every 6 (six) hours as needed for Wheezing. Rescue, Disp: 1 Box, Rfl: 1    alendronate (FOSAMAX) 70 MG tablet, Take 1 tablet (70 mg total) by mouth every 7 days., Disp: 12 tablet, Rfl: 1    atorvastatin (LIPITOR) 10 MG tablet, Take 1 tablet (10 mg total) by mouth once daily., Disp: 90 tablet, Rfl: 1    brimonidine 0.1% (ALPHAGAN P) 0.1 % Drop, Place 1 drop into both eyes 3 (three) times daily., Disp: 15 mL, Rfl: 1    calcitRIOL (ROCALTROL) 0.25 MCG Cap, Take 1 capsule (0.25 mcg total) by mouth once daily., Disp: 90 capsule, Rfl: 1    cholecalciferol, vitamin D3, 50,000 unit capsule, Take 1 capsule (50,000 Units total) by mouth every 7 days., Disp: 12 capsule, Rfl: 0    cilostazol (PLETAL) 50 MG Tab, Take 2 tablets (100 mg total) by mouth 2 (two) times daily., Disp: 360 tablet, Rfl: 1    diltiaZEM (CARDIZEM CD) 360 MG 24 hr capsule, Take 1 capsule (360 mg total) by mouth once daily., Disp: 90 capsule, Rfl: 1    donepezil (ARICEPT) 10 MG tablet, Take 1  tablet (10 mg total) by mouth once daily., Disp: 90 tablet, Rfl: 1    memantine (NAMENDA) 10 MG Tab, TAKE 1 TABLET TWICE DAILY, Disp: 180 tablet, Rfl: 1    ofloxacin (OCUFLOX) 0.3 % ophthalmic solution, , Disp: , Rfl:     omeprazole (PRILOSEC) 40 MG capsule, Take 1 capsule (40 mg total) by mouth once daily., Disp: 90 capsule, Rfl: 1    trimethoprim (TRIMPEX) 100 mg Tab, Take 1 tablet (100 mg total) by mouth every evening., Disp: 42 tablet, Rfl: 6    adjuvant AS01B, PF,vial 1 of 2 (SHINGRIX ADJUVANT COMPONENT-PF) Susp, Inject 0.5 mLs into the muscle once. for 1 dose, Disp: 0.5 mL, Rfl: 0    Review of Systems   Constitutional: Negative for appetite change (decreased appetite), chills, diaphoresis, fatigue, fever and unexpected weight change.   HENT: Negative for congestion, ear pain, hearing loss (wears hearing aides), nosebleeds, postnasal drip (chronic nasal drip), sinus pressure, sinus pain, sneezing, sore throat, tinnitus, trouble swallowing and voice change.    Eyes: Negative for photophobia, pain, itching and visual disturbance.   Respiratory: Negative for apnea, cough (chronic cough), chest tightness, shortness of breath (pulmonary fibrosis-SOB  nmmoving about or bending over), wheezing and stridor.    Cardiovascular: Negative for chest pain, palpitations and leg swelling.   Gastrointestinal: Negative for abdominal distention, abdominal pain, blood in stool, constipation, diarrhea, nausea and vomiting.   Endocrine: Negative for cold intolerance, heat intolerance, polydipsia and polyuria.   Genitourinary: Negative for difficulty urinating, dyspareunia, dysuria, flank pain, frequency, hematuria, menstrual problem, pelvic pain, urgency, vaginal discharge and vaginal pain.   Musculoskeletal: Negative for arthralgias, back pain (HPI), joint swelling, myalgias, neck pain and neck stiffness.   Skin: Negative for pallor.   Allergic/Immunologic: Negative for environmental allergies and food allergies.  "  Neurological: Positive for headaches (temple pain left earlier today). Negative for dizziness, tremors, speech difficulty, weakness, light-headedness and numbness.   Hematological: Does not bruise/bleed easily.   Psychiatric/Behavioral: Negative for agitation, confusion (memory poor), decreased concentration, sleep disturbance and suicidal ideas. The patient is not nervous/anxious.           Objective:      Vitals:    08/13/19 1327   BP: 126/60   Pulse: 68   Resp: 16   Temp: 98.3 °F (36.8 °C)   SpO2: 96%   Weight: 53.5 kg (118 lb)   Height: 5' 2" (1.575 m)     Physical Exam   Constitutional: She is oriented to person, place, and time. She appears well-developed and well-nourished. She is cooperative. No distress.   HENT:   Head: Normocephalic and atraumatic.   Right Ear: Tympanic membrane normal.   Left Ear: Tympanic membrane normal.   Mouth/Throat: Uvula is midline and mucous membranes are normal.   Nasal drip   Eyes: Pupils are equal, round, and reactive to light. Conjunctivae and EOM are normal. Right eye exhibits no discharge. Left eye exhibits no discharge. No scleral icterus. Right pupil is round and reactive. Left pupil is round and reactive.   Neck: Trachea normal and normal range of motion. Neck supple. No JVD present. Carotid bruit is not present. No thyromegaly present.   Cardiovascular: Normal rate, regular rhythm and intact distal pulses. Exam reveals no gallop and no friction rub.   No murmur heard.  Pulmonary/Chest: Effort normal. No respiratory distress. She has no wheezes. She has no rales.   Inspiratory dry fine rales anterior lungs   Abdominal: Soft. Bowel sounds are normal. She exhibits no distension and no mass. There is no tenderness. There is no guarding. No hernia.   Musculoskeletal: Normal range of motion. She exhibits no edema.   Neurological: She is alert and oriented to person, place, and time. She has normal strength.   Skin: Skin is warm and dry. Capillary refill takes less than 2 " seconds. No lesion and no rash noted. No cyanosis. Nails show no clubbing.   Bruises arms   Psychiatric: She has a normal mood and affect. Her speech is normal and behavior is normal. Judgment and thought content normal.   Nursing note and vitals reviewed.        Assessment:       1. Interstitial lung disease    2. Bronchitis    3. Vitamin D deficiency    4. Age-related osteoporosis without current pathological fracture    5. Essential hypertension    6. CHASTITY (renal artery stenosis)    7. Memory loss    8. Gastroesophageal reflux disease, esophagitis presence not specified    9. Pure hypercholesterolemia    10. Need for shingles vaccine         Plan:       Interstitial lung disease  -     albuterol-ipratropium (DUO-NEB) 2.5 mg-0.5 mg/3 mL nebulizer solution; Take 3 mLs by nebulization every 6 (six) hours as needed for Wheezing. Rescue  Dispense: 1 Box; Refill: 1    Bronchitis  -     albuterol-ipratropium (DUO-NEB) 2.5 mg-0.5 mg/3 mL nebulizer solution; Take 3 mLs by nebulization every 6 (six) hours as needed for Wheezing. Rescue  Dispense: 1 Box; Refill: 1    Vitamin D deficiency  -     Vitamin D; Future; Expected date: 08/14/2019  -     cholecalciferol, vitamin D3, 50,000 unit capsule; Take 1 capsule (50,000 Units total) by mouth every 7 days.  Dispense: 12 capsule; Refill: 0    Age-related osteoporosis without current pathological fracture  -     alendronate (FOSAMAX) 70 MG tablet; Take 1 tablet (70 mg total) by mouth every 7 days.  Dispense: 12 tablet; Refill: 1  -     calcitRIOL (ROCALTROL) 0.25 MCG Cap; Take 1 capsule (0.25 mcg total) by mouth once daily.  Dispense: 90 capsule; Refill: 1    Essential hypertension  -     cilostazol (PLETAL) 50 MG Tab; Take 2 tablets (100 mg total) by mouth 2 (two) times daily.  Dispense: 360 tablet; Refill: 1  -     diltiaZEM (CARDIZEM CD) 360 MG 24 hr capsule; Take 1 capsule (360 mg total) by mouth once daily.  Dispense: 90 capsule; Refill: 1    CHASTITY (renal artery stenosis)  -      cilostazol (PLETAL) 50 MG Tab; Take 2 tablets (100 mg total) by mouth 2 (two) times daily.  Dispense: 360 tablet; Refill: 1    Memory loss  -     cilostazol (PLETAL) 50 MG Tab; Take 2 tablets (100 mg total) by mouth 2 (two) times daily.  Dispense: 360 tablet; Refill: 1  -     donepezil (ARICEPT) 10 MG tablet; Take 1 tablet (10 mg total) by mouth once daily.  Dispense: 90 tablet; Refill: 1    Gastroesophageal reflux disease, esophagitis presence not specified  -     omeprazole (PRILOSEC) 40 MG capsule; Take 1 capsule (40 mg total) by mouth once daily.  Dispense: 90 capsule; Refill: 1    Pure hypercholesterolemia  -     atorvastatin (LIPITOR) 10 MG tablet; Take 1 tablet (10 mg total) by mouth once daily.  Dispense: 90 tablet; Refill: 1    Need for shingles vaccine  -     adjuvant AS01B, PF,vial 1 of 2 (SHINGRIX ADJUVANT COMPONENT-PF) Susp; Inject 0.5 mLs into the muscle once. for 1 dose  Dispense: 0.5 mL; Refill: 0    Other orders  -     brimonidine 0.1% (ALPHAGAN P) 0.1 % Drop; Place 1 drop into both eyes 3 (three) times daily.  Dispense: 15 mL; Refill: 1      Follow up in about 3 months (around 11/13/2019) for status-lungs, memory.        8/13/2019 Marcia Calzada M.D.

## 2019-08-14 LAB — 25(OH)D3 SERPL-MCNC: 17 NG/ML (ref 30–100)

## 2019-08-28 ENCOUNTER — LAB VISIT (OUTPATIENT)
Dept: LAB | Facility: HOSPITAL | Age: 84
End: 2019-08-28
Attending: INTERNAL MEDICINE
Payer: MEDICARE

## 2019-08-28 DIAGNOSIS — I10 ESSENTIAL HYPERTENSION, MALIGNANT: ICD-10-CM

## 2019-08-28 DIAGNOSIS — N25.81 SECONDARY HYPERPARATHYROIDISM OF RENAL ORIGIN: ICD-10-CM

## 2019-08-28 DIAGNOSIS — I12.9 HYPERTENSIVE NEPHROPATHY: ICD-10-CM

## 2019-08-28 DIAGNOSIS — N17.9 ACUTE KIDNEY FAILURE, UNSPECIFIED: Primary | ICD-10-CM

## 2019-08-28 DIAGNOSIS — E78.2 MIXED HYPERLIPIDEMIA: ICD-10-CM

## 2019-08-28 DIAGNOSIS — N18.30 CHRONIC KIDNEY DISEASE, STAGE III (MODERATE): ICD-10-CM

## 2019-08-28 DIAGNOSIS — N17.9 ACUTE KIDNEY FAILURE, UNSPECIFIED: ICD-10-CM

## 2019-08-28 LAB
ALBUMIN SERPL BCP-MCNC: 3.8 G/DL (ref 3.5–5.2)
ALBUMIN SERPL BCP-MCNC: 3.8 G/DL (ref 3.5–5.2)
ALP SERPL-CCNC: 72 U/L (ref 55–135)
ALT SERPL W/O P-5'-P-CCNC: 20 U/L (ref 10–44)
ANION GAP SERPL CALC-SCNC: 8 MMOL/L (ref 8–16)
ANION GAP SERPL CALC-SCNC: 8 MMOL/L (ref 8–16)
AST SERPL-CCNC: 30 U/L (ref 10–40)
BASOPHILS # BLD AUTO: 0.06 K/UL (ref 0–0.2)
BASOPHILS NFR BLD: 0.8 % (ref 0–1.9)
BILIRUB SERPL-MCNC: 0.7 MG/DL (ref 0.1–1)
BUN SERPL-MCNC: 18 MG/DL (ref 8–23)
BUN SERPL-MCNC: 18 MG/DL (ref 8–23)
CALCIUM SERPL-MCNC: 9.9 MG/DL (ref 8.7–10.5)
CALCIUM SERPL-MCNC: 9.9 MG/DL (ref 8.7–10.5)
CHLORIDE SERPL-SCNC: 106 MMOL/L (ref 95–110)
CHLORIDE SERPL-SCNC: 106 MMOL/L (ref 95–110)
CO2 SERPL-SCNC: 29 MMOL/L (ref 23–29)
CO2 SERPL-SCNC: 29 MMOL/L (ref 23–29)
CREAT SERPL-MCNC: 1.1 MG/DL (ref 0.5–1.4)
CREAT SERPL-MCNC: 1.1 MG/DL (ref 0.5–1.4)
DIFFERENTIAL METHOD: ABNORMAL
EOSINOPHIL # BLD AUTO: 0.3 K/UL (ref 0–0.5)
EOSINOPHIL NFR BLD: 4.2 % (ref 0–8)
ERYTHROCYTE [DISTWIDTH] IN BLOOD BY AUTOMATED COUNT: 12.6 % (ref 11.5–14.5)
EST. GFR  (AFRICAN AMERICAN): 52.9 ML/MIN/1.73 M^2
EST. GFR  (AFRICAN AMERICAN): 52.9 ML/MIN/1.73 M^2
EST. GFR  (NON AFRICAN AMERICAN): 45.9 ML/MIN/1.73 M^2
EST. GFR  (NON AFRICAN AMERICAN): 45.9 ML/MIN/1.73 M^2
GLUCOSE SERPL-MCNC: 89 MG/DL (ref 70–110)
GLUCOSE SERPL-MCNC: 89 MG/DL (ref 70–110)
HCT VFR BLD AUTO: 38.5 % (ref 37–48.5)
HGB BLD-MCNC: 12.4 G/DL (ref 12–16)
IMM GRANULOCYTES # BLD AUTO: 0.04 K/UL (ref 0–0.04)
IMM GRANULOCYTES NFR BLD AUTO: 0.6 % (ref 0–0.5)
LYMPHOCYTES # BLD AUTO: 1.7 K/UL (ref 1–4.8)
LYMPHOCYTES NFR BLD: 24.3 % (ref 18–48)
MCH RBC QN AUTO: 32.2 PG (ref 27–31)
MCHC RBC AUTO-ENTMCNC: 32.2 G/DL (ref 32–36)
MCV RBC AUTO: 100 FL (ref 82–98)
MONOCYTES # BLD AUTO: 0.7 K/UL (ref 0.3–1)
MONOCYTES NFR BLD: 10.3 % (ref 4–15)
NEUTROPHILS # BLD AUTO: 4.2 K/UL (ref 1.8–7.7)
NEUTROPHILS NFR BLD: 59.8 % (ref 38–73)
NRBC BLD-RTO: 0 /100 WBC
PHOSPHATE SERPL-MCNC: 3.6 MG/DL (ref 2.7–4.5)
PHOSPHATE SERPL-MCNC: 3.6 MG/DL (ref 2.7–4.5)
PLATELET # BLD AUTO: 288 K/UL (ref 150–350)
PMV BLD AUTO: 9.3 FL (ref 9.2–12.9)
POTASSIUM SERPL-SCNC: 4.3 MMOL/L (ref 3.5–5.1)
POTASSIUM SERPL-SCNC: 4.3 MMOL/L (ref 3.5–5.1)
PROT SERPL-MCNC: 7.5 G/DL (ref 6–8.4)
PTH-INTACT SERPL-MCNC: 50.3 PG/ML (ref 9–77)
RBC # BLD AUTO: 3.85 M/UL (ref 4–5.4)
SODIUM SERPL-SCNC: 143 MMOL/L (ref 136–145)
SODIUM SERPL-SCNC: 143 MMOL/L (ref 136–145)
WBC # BLD AUTO: 7.08 K/UL (ref 3.9–12.7)

## 2019-08-28 PROCEDURE — 80053 COMPREHEN METABOLIC PANEL: CPT

## 2019-08-28 PROCEDURE — 85025 COMPLETE CBC W/AUTO DIFF WBC: CPT

## 2019-08-28 PROCEDURE — 84100 ASSAY OF PHOSPHORUS: CPT

## 2019-08-28 PROCEDURE — 83970 ASSAY OF PARATHORMONE: CPT

## 2019-08-28 PROCEDURE — 36415 COLL VENOUS BLD VENIPUNCTURE: CPT

## 2019-10-29 DIAGNOSIS — M81.0 AGE-RELATED OSTEOPOROSIS WITHOUT CURRENT PATHOLOGICAL FRACTURE: ICD-10-CM

## 2019-10-29 RX ORDER — CALCITRIOL 0.25 UG/1
CAPSULE ORAL
Qty: 90 CAPSULE | Refills: 1 | Status: SHIPPED | OUTPATIENT
Start: 2019-10-29

## 2019-11-20 ENCOUNTER — OFFICE VISIT (OUTPATIENT)
Dept: FAMILY MEDICINE | Facility: CLINIC | Age: 84
End: 2019-11-20
Payer: MEDICARE

## 2019-11-20 VITALS
WEIGHT: 117 LBS | TEMPERATURE: 98 F | SYSTOLIC BLOOD PRESSURE: 136 MMHG | HEIGHT: 62 IN | BODY MASS INDEX: 21.53 KG/M2 | OXYGEN SATURATION: 97 % | HEART RATE: 64 BPM | RESPIRATION RATE: 16 BRPM | DIASTOLIC BLOOD PRESSURE: 64 MMHG

## 2019-11-20 DIAGNOSIS — I10 ESSENTIAL HYPERTENSION: ICD-10-CM

## 2019-11-20 DIAGNOSIS — I70.1 RAS (RENAL ARTERY STENOSIS): ICD-10-CM

## 2019-11-20 DIAGNOSIS — Z23 NEED FOR SHINGLES VACCINE: ICD-10-CM

## 2019-11-20 DIAGNOSIS — E55.9 VITAMIN D DEFICIENCY: ICD-10-CM

## 2019-11-20 DIAGNOSIS — M81.0 AGE-RELATED OSTEOPOROSIS WITHOUT CURRENT PATHOLOGICAL FRACTURE: ICD-10-CM

## 2019-11-20 DIAGNOSIS — I73.9 PAD (PERIPHERAL ARTERY DISEASE): ICD-10-CM

## 2019-11-20 DIAGNOSIS — R41.3 MEMORY LOSS: ICD-10-CM

## 2019-11-20 DIAGNOSIS — K21.9 GASTROESOPHAGEAL REFLUX DISEASE, ESOPHAGITIS PRESENCE NOT SPECIFIED: ICD-10-CM

## 2019-11-20 DIAGNOSIS — M79.605 LEG PAIN, LATERAL, LEFT: Primary | ICD-10-CM

## 2019-11-20 DIAGNOSIS — E78.00 PURE HYPERCHOLESTEROLEMIA: ICD-10-CM

## 2019-11-20 PROCEDURE — 99214 PR OFFICE/OUTPT VISIT, EST, LEVL IV, 30-39 MIN: ICD-10-PCS | Mod: S$GLB,,, | Performed by: INTERNAL MEDICINE

## 2019-11-20 PROCEDURE — 1125F PR PAIN SEVERITY QUANTIFIED, PAIN PRESENT: ICD-10-PCS | Mod: S$GLB,,, | Performed by: INTERNAL MEDICINE

## 2019-11-20 PROCEDURE — 99214 OFFICE O/P EST MOD 30 MIN: CPT | Mod: S$GLB,,, | Performed by: INTERNAL MEDICINE

## 2019-11-20 PROCEDURE — 1101F PT FALLS ASSESS-DOCD LE1/YR: CPT | Mod: S$GLB,,, | Performed by: INTERNAL MEDICINE

## 2019-11-20 PROCEDURE — 3075F SYST BP GE 130 - 139MM HG: CPT | Mod: S$GLB,,, | Performed by: INTERNAL MEDICINE

## 2019-11-20 PROCEDURE — 3075F PR MOST RECENT SYSTOLIC BLOOD PRESS GE 130-139MM HG: ICD-10-PCS | Mod: S$GLB,,, | Performed by: INTERNAL MEDICINE

## 2019-11-20 PROCEDURE — 3078F DIAST BP <80 MM HG: CPT | Mod: S$GLB,,, | Performed by: INTERNAL MEDICINE

## 2019-11-20 PROCEDURE — 1125F AMNT PAIN NOTED PAIN PRSNT: CPT | Mod: S$GLB,,, | Performed by: INTERNAL MEDICINE

## 2019-11-20 PROCEDURE — 1159F PR MEDICATION LIST DOCUMENTED IN MEDICAL RECORD: ICD-10-PCS | Mod: S$GLB,,, | Performed by: INTERNAL MEDICINE

## 2019-11-20 PROCEDURE — 3078F PR MOST RECENT DIASTOLIC BLOOD PRESSURE < 80 MM HG: ICD-10-PCS | Mod: S$GLB,,, | Performed by: INTERNAL MEDICINE

## 2019-11-20 PROCEDURE — 1101F PR PT FALLS ASSESS DOC 0-1 FALLS W/OUT INJ PAST YR: ICD-10-PCS | Mod: S$GLB,,, | Performed by: INTERNAL MEDICINE

## 2019-11-20 PROCEDURE — 1159F MED LIST DOCD IN RCRD: CPT | Mod: S$GLB,,, | Performed by: INTERNAL MEDICINE

## 2019-11-20 RX ORDER — NAPROXEN SODIUM 220 MG/1
81 TABLET, FILM COATED ORAL DAILY
Refills: 0 | COMMUNITY
Start: 2019-11-20 | End: 2020-05-12 | Stop reason: CLARIF

## 2019-11-20 RX ORDER — ASPIRIN 325 MG
50000 TABLET, DELAYED RELEASE (ENTERIC COATED) ORAL
Qty: 12 CAPSULE | Refills: 0 | Status: SHIPPED | OUTPATIENT
Start: 2019-11-20

## 2019-11-20 RX ORDER — CILOSTAZOL 50 MG/1
100 TABLET ORAL 2 TIMES DAILY
Qty: 360 TABLET | Refills: 1 | Status: SHIPPED | OUTPATIENT
Start: 2019-11-20 | End: 2020-05-12 | Stop reason: CLARIF

## 2019-11-20 RX ORDER — OMEPRAZOLE 40 MG/1
40 CAPSULE, DELAYED RELEASE ORAL DAILY
Qty: 90 CAPSULE | Refills: 1 | Status: SHIPPED | OUTPATIENT
Start: 2019-11-20 | End: 2020-05-12 | Stop reason: CLARIF

## 2019-11-20 RX ORDER — DONEPEZIL HYDROCHLORIDE 10 MG/1
10 TABLET, FILM COATED ORAL DAILY
Qty: 90 TABLET | Refills: 1 | Status: SHIPPED | OUTPATIENT
Start: 2019-11-20 | End: 2020-05-28

## 2019-11-20 RX ORDER — ALENDRONATE SODIUM 70 MG/1
70 TABLET ORAL
Qty: 12 TABLET | Refills: 1 | Status: SHIPPED | OUTPATIENT
Start: 2019-11-20 | End: 2020-05-28

## 2019-11-20 RX ORDER — MEMANTINE HYDROCHLORIDE 10 MG/1
10 TABLET ORAL 2 TIMES DAILY
Qty: 180 TABLET | Refills: 1 | Status: SHIPPED | OUTPATIENT
Start: 2019-11-20

## 2019-11-20 RX ORDER — ATORVASTATIN CALCIUM 10 MG/1
10 TABLET, FILM COATED ORAL DAILY
Qty: 90 TABLET | Refills: 1 | Status: SHIPPED | OUTPATIENT
Start: 2019-11-20 | End: 2020-05-12 | Stop reason: CLARIF

## 2019-11-20 RX ORDER — DILTIAZEM HYDROCHLORIDE 360 MG/1
360 CAPSULE, EXTENDED RELEASE ORAL DAILY
Qty: 90 CAPSULE | Refills: 1 | Status: SHIPPED | OUTPATIENT
Start: 2019-11-20

## 2019-11-20 NOTE — PROGRESS NOTES
SUBJECTIVE:    Patient ID: Jessica Naidu is a 85 y.o. female.    Chief Complaint: Vitamin D Deficiency and Follow-up (3 month)    HPI    Patient comes in for recheck a vitamin-D level, previously very low at 17.  She is on rocalcitrol and calcium, so we must be cautious to prevent hypercalcemia.    Labs look good-GFR 46cc  Lab Visit on 08/28/2019   Component Date Value Ref Range Status    Glucose 08/28/2019 89  70 - 110 mg/dL Final    Sodium 08/28/2019 143  136 - 145 mmol/L Final    Potassium 08/28/2019 4.3  3.5 - 5.1 mmol/L Final    Chloride 08/28/2019 106  95 - 110 mmol/L Final    CO2 08/28/2019 29  23 - 29 mmol/L Final    BUN, Bld 08/28/2019 18  8 - 23 mg/dL Final    Calcium 08/28/2019 9.9  8.7 - 10.5 mg/dL Final    Creatinine 08/28/2019 1.1  0.5 - 1.4 mg/dL Final    Albumin 08/28/2019 3.8  3.5 - 5.2 g/dL Final    Phosphorus 08/28/2019 3.6  2.7 - 4.5 mg/dL Final    eGFR if  08/28/2019 52.9* >60 mL/min/1.73 m^2 Final    eGFR if non African American 08/28/2019 45.9* >60 mL/min/1.73 m^2 Final    Anion Gap 08/28/2019 8  8 - 16 mmol/L Final    Sodium 08/28/2019 143  136 - 145 mmol/L Final    Potassium 08/28/2019 4.3  3.5 - 5.1 mmol/L Final    Chloride 08/28/2019 106  95 - 110 mmol/L Final    CO2 08/28/2019 29  23 - 29 mmol/L Final    Glucose 08/28/2019 89  70 - 110 mg/dL Final    BUN, Bld 08/28/2019 18  8 - 23 mg/dL Final    Creatinine 08/28/2019 1.1  0.5 - 1.4 mg/dL Final    Calcium 08/28/2019 9.9  8.7 - 10.5 mg/dL Final    Total Protein 08/28/2019 7.5  6.0 - 8.4 g/dL Final    Albumin 08/28/2019 3.8  3.5 - 5.2 g/dL Final    Total Bilirubin 08/28/2019 0.7  0.1 - 1.0 mg/dL Final    Alkaline Phosphatase 08/28/2019 72  55 - 135 U/L Final    AST 08/28/2019 30  10 - 40 U/L Final    ALT 08/28/2019 20  10 - 44 U/L Final    Anion Gap 08/28/2019 8  8 - 16 mmol/L Final    eGFR if  08/28/2019 52.9* >60 mL/min/1.73 m^2 Final    eGFR if non African American  08/28/2019 45.9* >60 mL/min/1.73 m^2 Final    WBC 08/28/2019 7.08  3.90 - 12.70 K/uL Final    RBC 08/28/2019 3.85* 4.00 - 5.40 M/uL Final    Hemoglobin 08/28/2019 12.4  12.0 - 16.0 g/dL Final    Hematocrit 08/28/2019 38.5  37.0 - 48.5 % Final    Mean Corpuscular Volume 08/28/2019 100* 82 - 98 fL Final    Mean Corpuscular Hemoglobin 08/28/2019 32.2* 27.0 - 31.0 pg Final    Mean Corpuscular Hemoglobin Conc 08/28/2019 32.2  32.0 - 36.0 g/dL Final    RDW 08/28/2019 12.6  11.5 - 14.5 % Final    Platelets 08/28/2019 288  150 - 350 K/uL Final    MPV 08/28/2019 9.3  9.2 - 12.9 fL Final    Immature Granulocytes 08/28/2019 0.6* 0.0 - 0.5 % Final    Gran # (ANC) 08/28/2019 4.2  1.8 - 7.7 K/uL Final    Immature Grans (Abs) 08/28/2019 0.04  0.00 - 0.04 K/uL Final    Lymph # 08/28/2019 1.7  1.0 - 4.8 K/uL Final    Mono # 08/28/2019 0.7  0.3 - 1.0 K/uL Final    Eos # 08/28/2019 0.3  0.0 - 0.5 K/uL Final    Baso # 08/28/2019 0.06  0.00 - 0.20 K/uL Final    nRBC 08/28/2019 0  0 /100 WBC Final    Gran% 08/28/2019 59.8  38.0 - 73.0 % Final    Lymph% 08/28/2019 24.3  18.0 - 48.0 % Final    Mono% 08/28/2019 10.3  4.0 - 15.0 % Final    Eosinophil% 08/28/2019 4.2  0.0 - 8.0 % Final    Basophil% 08/28/2019 0.8  0.0 - 1.9 % Final    Differential Method 08/28/2019 Automated   Final    Phosphorus 08/28/2019 3.6  2.7 - 4.5 mg/dL Final    PTH, Intact 08/28/2019 50.3  9.0 - 77.0 pg/mL Final   Orders Only on 08/13/2019   Component Date Value Ref Range Status    Vitamin D, 25-OH, Total 08/13/2019 17* 30 - 100 ng/mL Final       Past Medical History:   Diagnosis Date    Arthritis     Hearing loss, conductive, bilateral     High cholesterol     History of shingles     vaginal    Hypertension     Kidney artery constriction     has renal artery stent    Lumbar radiculopathy     Osteoporosis     PONV (postoperative nausea and vomiting)     PVD (peripheral vascular disease)     CHASTITY (renal artery stenosis) 11/26/2018     Stroke     of eye vasculature     Past Surgical History:   Procedure Laterality Date    ANTERIOR CERVICAL DISCECTOMY W/ FUSION      APPENDECTOMY      BLADDER SUSPENSION      CERVICAL SPINE SURGERY      COLONOSCOPY      COSMETIC SURGERY      EYE SURGERY      HYSTERECTOMY       Family History   Problem Relation Age of Onset    Arthritis Mother     Hearing loss Mother     Heart disease Mother     Hypertension Mother     Kidney disease Mother     Stroke Mother     Heart disease Father        Marital Status: Single  Alcohol History:  reports that she does not drink alcohol.  Tobacco History:  reports that she has quit smoking. Her smoking use included cigarettes. She smoked 1.00 pack per day. She has never used smokeless tobacco.  Drug History:  reports that she does not use drugs.    Review of patient's allergies indicates:   Allergen Reactions    Adhesive     Arb-angiotensin receptor antagonist     Atacand [candesartan]     Bactrim [sulfamethoxazole-trimethoprim]     Codeine Hives    Penicillins Hives    Trental [pentoxifylline]        Current Outpatient Medications:     alendronate (FOSAMAX) 70 MG tablet, Take 1 tablet (70 mg total) by mouth every 7 days., Disp: 12 tablet, Rfl: 1    atorvastatin (LIPITOR) 10 MG tablet, Take 1 tablet (10 mg total) by mouth once daily., Disp: 90 tablet, Rfl: 1    brimonidine 0.1% (ALPHAGAN P) 0.1 % Drop, Place 1 drop into both eyes 3 (three) times daily., Disp: 15 mL, Rfl: 1    calcitRIOL (ROCALTROL) 0.25 MCG Cap, TAKE 1 CAPSULE EVERY DAY, Disp: 90 capsule, Rfl: 1    cholecalciferol, vitamin D3, 50,000 unit capsule, Take 1 capsule (50,000 Units total) by mouth every 7 days., Disp: 12 capsule, Rfl: 0    cilostazol (PLETAL) 50 MG Tab, Take 2 tablets (100 mg total) by mouth 2 (two) times daily., Disp: 360 tablet, Rfl: 1    diltiaZEM (CARDIZEM CD) 360 MG 24 hr capsule, Take 1 capsule (360 mg total) by mouth once daily., Disp: 90 capsule, Rfl: 1    donepezil  (ARICEPT) 10 MG tablet, Take 1 tablet (10 mg total) by mouth once daily., Disp: 90 tablet, Rfl: 1    memantine (NAMENDA) 10 MG Tab, Take 1 tablet (10 mg total) by mouth 2 (two) times daily., Disp: 180 tablet, Rfl: 1    ofloxacin (OCUFLOX) 0.3 % ophthalmic solution, , Disp: , Rfl:     omeprazole (PRILOSEC) 40 MG capsule, Take 1 capsule (40 mg total) by mouth once daily., Disp: 90 capsule, Rfl: 1    trimethoprim (TRIMPEX) 100 mg Tab, Take 1 tablet (100 mg total) by mouth every evening., Disp: 42 tablet, Rfl: 6    adjuvant AS01B, PF,vial 1 of 2 (SHINGRIX ADJUVANT COMPONENT-PF) Susp, Inject 0.5 mLs into the muscle once. for 1 dose, Disp: 0.5 mL, Rfl: 0    aspirin 81 MG Chew, Take 1 tablet (81 mg total) by mouth once daily., Disp: , Rfl: 0    Review of Systems   Constitutional: Negative for chills, fatigue, fever and unexpected weight change.   HENT: Negative for congestion, ear pain, hearing loss, sinus pain and sore throat.    Eyes: Positive for discharge (clear discharge from the left eye). Negative for pain and visual disturbance.   Respiratory: Positive for cough (chronic). Negative for shortness of breath and wheezing.    Cardiovascular: Negative for chest pain, palpitations and leg swelling.   Gastrointestinal: Negative for abdominal pain, blood in stool, constipation, diarrhea, nausea and vomiting.   Endocrine: Negative for cold intolerance and heat intolerance.   Genitourinary: Negative for difficulty urinating, dysuria, frequency, pelvic pain and urgency.   Musculoskeletal: Positive for back pain (low back pain-has received injections ). Negative for joint swelling and neck pain.        Tender lower left leg   Skin: Negative for pallor and rash.   Neurological: Negative for dizziness, tremors, weakness, numbness and headaches.   Hematological: Does not bruise/bleed easily.   Psychiatric/Behavioral: Negative for agitation, sleep disturbance and suicidal ideas. The patient is nervous/anxious (chronic).       "     Objective:      Vitals:    11/20/19 0929   BP: 136/64   Pulse: 64   Resp: 16   Temp: 97.6 °F (36.4 °C)   SpO2: 97%   Weight: 53.1 kg (117 lb)   Height: 5' 2" (1.575 m)     Physical Exam   Constitutional: She is oriented to person, place, and time. She appears well-developed and well-nourished. She is cooperative.   HENT:   Right Ear: Tympanic membrane normal.   Left Ear: Tympanic membrane normal.   Eyes: Conjunctivae, EOM and lids are normal. Right pupil is round and reactive. Left pupil is round and reactive.   Neck: Trachea normal and normal range of motion. Neck supple. No JVD present. Carotid bruit is not present. No thyromegaly present.   Cardiovascular: Normal rate, regular rhythm, S1 normal, S2 normal, normal heart sounds and intact distal pulses. Exam reveals no gallop and no friction rub.   No murmur heard.  Pulmonary/Chest: Breath sounds normal. No respiratory distress. She has no wheezes. She has no rales.   Abdominal: Soft. Bowel sounds are normal. She exhibits no mass. There is no tenderness. There is no rigidity and no guarding.   Musculoskeletal: She exhibits tenderness (lower left lateral leg). She exhibits no edema.   Neurological: She is alert and oriented to person, place, and time.   Skin: Skin is warm and dry. Capillary refill takes less than 2 seconds. No lesion and no rash noted. Nails show no clubbing.   Psychiatric: She has a normal mood and affect. Her behavior is normal. Judgment and thought content normal.   Nursing note and vitals reviewed.        Assessment:       1. Essential hypertension    2. Pure hypercholesterolemia    3. Vitamin D deficiency    4. Gastroesophageal reflux disease, esophagitis presence not specified    5. Memory loss    6. CHASTITY (renal artery stenosis)    7. Age-related osteoporosis without current pathological fracture    8. Leg pain, lateral, left    9. Need for shingles vaccine    10. PAD (peripheral artery disease)         Plan:       Essential " hypertension  -     CBC auto differential; Future; Expected date: 11/20/2019  -     Comprehensive metabolic panel; Future; Expected date: 11/20/2019  -     Urinalysis Microscopic; Future; Expected date: 11/20/2019  -     diltiaZEM (CARDIZEM CD) 360 MG 24 hr capsule; Take 1 capsule (360 mg total) by mouth once daily.  Dispense: 90 capsule; Refill: 1  -     cilostazol (PLETAL) 50 MG Tab; Take 2 tablets (100 mg total) by mouth 2 (two) times daily.  Dispense: 360 tablet; Refill: 1    Pure hypercholesterolemia  -     Comprehensive metabolic panel; Future; Expected date: 11/20/2019  -     Lipid panel; Future; Expected date: 11/20/2019  -     atorvastatin (LIPITOR) 10 MG tablet; Take 1 tablet (10 mg total) by mouth once daily.  Dispense: 90 tablet; Refill: 1    Vitamin D deficiency  -     Vitamin D; Future; Expected date: 11/20/2019  -     Calcium; Future; Expected date: 11/20/2019  -     cholecalciferol, vitamin D3, 50,000 unit capsule; Take 1 capsule (50,000 Units total) by mouth every 7 days.  Dispense: 12 capsule; Refill: 0    Gastroesophageal reflux disease, esophagitis presence not specified  -     omeprazole (PRILOSEC) 40 MG capsule; Take 1 capsule (40 mg total) by mouth once daily.  Dispense: 90 capsule; Refill: 1    Memory loss  -     memantine (NAMENDA) 10 MG Tab; Take 1 tablet (10 mg total) by mouth 2 (two) times daily.  Dispense: 180 tablet; Refill: 1  -     donepezil (ARICEPT) 10 MG tablet; Take 1 tablet (10 mg total) by mouth once daily.  Dispense: 90 tablet; Refill: 1  -     cilostazol (PLETAL) 50 MG Tab; Take 2 tablets (100 mg total) by mouth 2 (two) times daily.  Dispense: 360 tablet; Refill: 1    CHASTITY (renal artery stenosis)  -     cilostazol (PLETAL) 50 MG Tab; Take 2 tablets (100 mg total) by mouth 2 (two) times daily.  Dispense: 360 tablet; Refill: 1    Age-related osteoporosis without current pathological fracture  -     alendronate (FOSAMAX) 70 MG tablet; Take 1 tablet (70 mg total) by mouth every 7  days.  Dispense: 12 tablet; Refill: 1    Leg pain, lateral, left  -     US Lower Extremity Veins Left; Future; Expected date: 11/20/2019  -     aspirin 81 MG Chew; Take 1 tablet (81 mg total) by mouth once daily.; Refill: 0    Need for shingles vaccine  -     adjuvant AS01B, PF,vial 1 of 2 (SHINGRIX ADJUVANT COMPONENT-PF) Susp; Inject 0.5 mLs into the muscle once. for 1 dose  Dispense: 0.5 mL; Refill: 0    PAD (peripheral artery disease)  -     aspirin 81 MG Chew; Take 1 tablet (81 mg total) by mouth once daily.; Refill: 0      No follow-ups on file.        11/20/2019 Marcia Calzada M.D.

## 2020-01-02 ENCOUNTER — CLINICAL SUPPORT (OUTPATIENT)
Dept: FAMILY MEDICINE | Facility: CLINIC | Age: 85
End: 2020-01-02
Payer: MEDICARE

## 2020-01-02 VITALS — TEMPERATURE: 98 F

## 2020-01-02 DIAGNOSIS — L29.9 ITCHING: Primary | ICD-10-CM

## 2020-01-02 PROCEDURE — 96372 THER/PROPH/DIAG INJ SC/IM: CPT | Mod: S$GLB,,, | Performed by: FAMILY MEDICINE

## 2020-01-02 PROCEDURE — 96372 PR INJECTION,THERAP/PROPH/DIAG2ST, IM OR SUBCUT: ICD-10-PCS | Mod: S$GLB,,, | Performed by: FAMILY MEDICINE

## 2020-01-02 NOTE — PROGRESS NOTES
Patient stated her name and date of birth, Pt was seen for steroid shot. Injection give in left hip, tolerated well. Instructed to wait 15 minutes in the waiting room to insure no adverse reactions. Patient verbally stated she understood.

## 2020-01-04 ENCOUNTER — HOSPITAL ENCOUNTER (EMERGENCY)
Facility: HOSPITAL | Age: 85
Discharge: HOME | End: 2020-01-04
Attending: EMERGENCY MEDICINE
Payer: MEDICARE

## 2020-01-04 VITALS
HEART RATE: 57 BPM | BODY MASS INDEX: 22.58 KG/M2 | HEIGHT: 60 IN | OXYGEN SATURATION: 98 % | RESPIRATION RATE: 25 BRPM | WEIGHT: 115 LBS | DIASTOLIC BLOOD PRESSURE: 74 MMHG | SYSTOLIC BLOOD PRESSURE: 133 MMHG | TEMPERATURE: 98 F

## 2020-01-04 DIAGNOSIS — K59.00 CONSTIPATION: ICD-10-CM

## 2020-01-04 DIAGNOSIS — L50.9 URTICARIA: Primary | ICD-10-CM

## 2020-01-04 DIAGNOSIS — R06.00 DYSPNEA, UNSPECIFIED TYPE: ICD-10-CM

## 2020-01-04 LAB
ALBUMIN SERPL BCP-MCNC: 3.9 G/DL (ref 3.5–5.2)
ALP SERPL-CCNC: 59 U/L (ref 55–135)
ALT SERPL W/O P-5'-P-CCNC: 19 U/L (ref 10–44)
ANION GAP SERPL CALC-SCNC: 12 MMOL/L (ref 8–16)
AST SERPL-CCNC: 32 U/L (ref 10–40)
BASOPHILS # BLD AUTO: 0.03 K/UL (ref 0–0.2)
BASOPHILS NFR BLD: 0.3 % (ref 0–1.9)
BILIRUB SERPL-MCNC: 1 MG/DL (ref 0.1–1)
BNP SERPL-MCNC: 45 PG/ML (ref 0–99)
BUN SERPL-MCNC: 36 MG/DL (ref 8–23)
CALCIUM SERPL-MCNC: 9.7 MG/DL (ref 8.7–10.5)
CHLORIDE SERPL-SCNC: 102 MMOL/L (ref 95–110)
CO2 SERPL-SCNC: 26 MMOL/L (ref 23–29)
CREAT SERPL-MCNC: 1 MG/DL (ref 0.5–1.4)
CRP SERPL-MCNC: 0.15 MG/DL (ref 0–0.75)
DIFFERENTIAL METHOD: ABNORMAL
EOSINOPHIL # BLD AUTO: 0.1 K/UL (ref 0–0.5)
EOSINOPHIL NFR BLD: 0.8 % (ref 0–8)
ERYTHROCYTE [DISTWIDTH] IN BLOOD BY AUTOMATED COUNT: 14 % (ref 11.5–14.5)
ERYTHROCYTE [SEDIMENTATION RATE] IN BLOOD BY WESTERGREN METHOD: 10 MM/HR (ref 0–20)
EST. GFR  (AFRICAN AMERICAN): 59.4 ML/MIN/1.73 M^2
EST. GFR  (NON AFRICAN AMERICAN): 51.5 ML/MIN/1.73 M^2
GLUCOSE SERPL-MCNC: 87 MG/DL (ref 70–110)
HCT VFR BLD AUTO: 41.4 % (ref 37–48.5)
HGB BLD-MCNC: 13.7 G/DL (ref 12–16)
IMM GRANULOCYTES # BLD AUTO: 0.12 K/UL (ref 0–0.04)
IMM GRANULOCYTES NFR BLD AUTO: 1.1 % (ref 0–0.5)
LYMPHOCYTES # BLD AUTO: 2.2 K/UL (ref 1–4.8)
LYMPHOCYTES NFR BLD: 21 % (ref 18–48)
MCH RBC QN AUTO: 31.4 PG (ref 27–31)
MCHC RBC AUTO-ENTMCNC: 33.1 G/DL (ref 32–36)
MCV RBC AUTO: 95 FL (ref 82–98)
MONOCYTES # BLD AUTO: 0.7 K/UL (ref 0.3–1)
MONOCYTES NFR BLD: 6.8 % (ref 4–15)
NEUTROPHILS # BLD AUTO: 7.4 K/UL (ref 1.8–7.7)
NEUTROPHILS NFR BLD: 70 % (ref 38–73)
NRBC BLD-RTO: 0 /100 WBC
PLATELET # BLD AUTO: 279 K/UL (ref 150–350)
PMV BLD AUTO: 9.6 FL (ref 9.2–12.9)
POTASSIUM SERPL-SCNC: 4 MMOL/L (ref 3.5–5.1)
PROT SERPL-MCNC: 7 G/DL (ref 6–8.4)
RBC # BLD AUTO: 4.36 M/UL (ref 4–5.4)
SODIUM SERPL-SCNC: 140 MMOL/L (ref 136–145)
TROPONIN I SERPL DL<=0.01 NG/ML-MCNC: <0.03 NG/ML
TROPONIN I SERPL DL<=0.01 NG/ML-MCNC: <0.03 NG/ML
WBC # BLD AUTO: 10.52 K/UL (ref 3.9–12.7)

## 2020-01-04 PROCEDURE — 85651 RBC SED RATE NONAUTOMATED: CPT

## 2020-01-04 PROCEDURE — 83880 ASSAY OF NATRIURETIC PEPTIDE: CPT

## 2020-01-04 PROCEDURE — 93005 ELECTROCARDIOGRAM TRACING: CPT

## 2020-01-04 PROCEDURE — S0028 INJECTION, FAMOTIDINE, 20 MG: HCPCS | Performed by: EMERGENCY MEDICINE

## 2020-01-04 PROCEDURE — 94640 AIRWAY INHALATION TREATMENT: CPT

## 2020-01-04 PROCEDURE — 84484 ASSAY OF TROPONIN QUANT: CPT

## 2020-01-04 PROCEDURE — 96375 TX/PRO/DX INJ NEW DRUG ADDON: CPT

## 2020-01-04 PROCEDURE — 85025 COMPLETE CBC W/AUTO DIFF WBC: CPT

## 2020-01-04 PROCEDURE — 86140 C-REACTIVE PROTEIN: CPT

## 2020-01-04 PROCEDURE — 36415 COLL VENOUS BLD VENIPUNCTURE: CPT

## 2020-01-04 PROCEDURE — 25000003 PHARM REV CODE 250: Performed by: EMERGENCY MEDICINE

## 2020-01-04 PROCEDURE — 80053 COMPREHEN METABOLIC PANEL: CPT

## 2020-01-04 PROCEDURE — 96374 THER/PROPH/DIAG INJ IV PUSH: CPT

## 2020-01-04 PROCEDURE — 25000242 PHARM REV CODE 250 ALT 637 W/ HCPCS: Performed by: EMERGENCY MEDICINE

## 2020-01-04 PROCEDURE — 99291 CRITICAL CARE FIRST HOUR: CPT

## 2020-01-04 PROCEDURE — 63600175 PHARM REV CODE 636 W HCPCS: Performed by: EMERGENCY MEDICINE

## 2020-01-04 RX ORDER — DIPHENHYDRAMINE HYDROCHLORIDE 50 MG/ML
25 INJECTION INTRAMUSCULAR; INTRAVENOUS
Status: COMPLETED | OUTPATIENT
Start: 2020-01-04 | End: 2020-01-04

## 2020-01-04 RX ORDER — ACETAMINOPHEN 500 MG
1000 TABLET ORAL
Status: COMPLETED | OUTPATIENT
Start: 2020-01-04 | End: 2020-01-04

## 2020-01-04 RX ORDER — PREDNISONE 50 MG/1
50 TABLET ORAL DAILY
Qty: 5 TABLET | Refills: 0 | Status: SHIPPED | OUTPATIENT
Start: 2020-01-04 | End: 2020-05-12 | Stop reason: CLARIF

## 2020-01-04 RX ORDER — LEVALBUTEROL 1.25 MG/.5ML
1.25 SOLUTION, CONCENTRATE RESPIRATORY (INHALATION)
Status: COMPLETED | OUTPATIENT
Start: 2020-01-04 | End: 2020-01-04

## 2020-01-04 RX ORDER — FAMOTIDINE 10 MG/ML
20 INJECTION INTRAVENOUS
Status: COMPLETED | OUTPATIENT
Start: 2020-01-04 | End: 2020-01-04

## 2020-01-04 RX ORDER — PREDNISONE 20 MG/1
60 TABLET ORAL
Status: COMPLETED | OUTPATIENT
Start: 2020-01-04 | End: 2020-01-04

## 2020-01-04 RX ORDER — IPRATROPIUM BROMIDE 42 UG/1
1 SPRAY, METERED NASAL 3 TIMES DAILY PRN
COMMUNITY
Start: 2019-12-17 | End: 2020-05-12 | Stop reason: CLARIF

## 2020-01-04 RX ORDER — METHYLPREDNISOLONE SOD SUCC 125 MG
125 VIAL (EA) INJECTION
Status: COMPLETED | OUTPATIENT
Start: 2020-01-04 | End: 2020-01-04

## 2020-01-04 RX ADMIN — PREDNISONE 60 MG: 20 TABLET ORAL at 04:01

## 2020-01-04 RX ADMIN — METHYLPREDNISOLONE SODIUM SUCCINATE 125 MG: 125 INJECTION, POWDER, FOR SOLUTION INTRAMUSCULAR; INTRAVENOUS at 10:01

## 2020-01-04 RX ADMIN — LEVALBUTEROL HYDROCHLORIDE 1.25 MG: 1.25 SOLUTION, CONCENTRATE RESPIRATORY (INHALATION) at 10:01

## 2020-01-04 RX ADMIN — FAMOTIDINE 20 MG: 10 INJECTION INTRAVENOUS at 10:01

## 2020-01-04 RX ADMIN — ACETAMINOPHEN 1000 MG: 500 TABLET, FILM COATED ORAL at 04:01

## 2020-01-04 RX ADMIN — DIPHENHYDRAMINE HYDROCHLORIDE 25 MG: 50 INJECTION INTRAMUSCULAR; INTRAVENOUS at 10:01

## 2020-01-04 NOTE — ED PROVIDER NOTES
Encounter Date: 1/4/2020       History     Chief Complaint   Patient presents with    Back Pain     hurts all over    Rash     states had itching all over and got shot at primary on thursday and still having pain and itching all over    Shortness of Breath     85-year-old female with history of arthritis, hypertension, osteoporosis previous stroke.  Patient presents to the emergency department with complaint of generalized urticarial rash which began today.  Patient states that she was seen in the emergency department approximately 3 days ago and was given a shot of steroids and sent home.  Her rash did resolve.  However today it came back.  She denies any new environmental exposures, any new medications.  She states she was not discharged on steroids.  Patient currently without chest pain, does have history of constipation as well states that she has been having difficulty with bowel movements over last week and half.        Review of patient's allergies indicates:   Allergen Reactions    Adhesive     Arb-angiotensin receptor antagonist     Atacand [candesartan]     Bactrim [sulfamethoxazole-trimethoprim]     Codeine Hives    Penicillins Hives    Trental [pentoxifylline]      Past Medical History:   Diagnosis Date    Arthritis     Hearing loss, conductive, bilateral     High cholesterol     History of shingles     vaginal    Hypertension     Kidney artery constriction     has renal artery stent    Lumbar radiculopathy     Osteoporosis     PONV (postoperative nausea and vomiting)     PVD (peripheral vascular disease)     CHASTITY (renal artery stenosis) 11/26/2018    Stroke     of eye vasculature     Past Surgical History:   Procedure Laterality Date    ANTERIOR CERVICAL DISCECTOMY W/ FUSION      APPENDECTOMY      BLADDER SUSPENSION      CERVICAL SPINE SURGERY      COLONOSCOPY      COSMETIC SURGERY      EYE SURGERY      HYSTERECTOMY       Family History   Problem Relation Age of Onset     Arthritis Mother     Hearing loss Mother     Heart disease Mother     Hypertension Mother     Kidney disease Mother     Stroke Mother     Heart disease Father      Social History     Tobacco Use    Smoking status: Former Smoker     Packs/day: 1.00     Types: Cigarettes    Smokeless tobacco: Never Used    Tobacco comment: quit 30 yrs ago   Substance Use Topics    Alcohol use: No    Drug use: No     Review of Systems   Constitutional: Negative for fever.   HENT: Negative for sore throat.    Respiratory: Negative for shortness of breath.    Cardiovascular: Negative for chest pain.   Gastrointestinal: Negative for nausea.   Genitourinary: Negative for dysuria.   Musculoskeletal: Negative for back pain.   Skin: Positive for rash.   Allergic/Immunologic: Negative for environmental allergies and food allergies.   Neurological: Negative for weakness, light-headedness and numbness.   Hematological: Does not bruise/bleed easily.   Psychiatric/Behavioral: Negative for confusion and self-injury. The patient is not nervous/anxious.        Physical Exam     Initial Vitals [01/04/20 0844]   BP Pulse Resp Temp SpO2   (!) 153/75 68 20 98.5 °F (36.9 °C) 96 %      MAP       --         Physical Exam    Nursing note and vitals reviewed.  Constitutional: She appears well-developed and well-nourished.   HENT:   Head: Normocephalic and atraumatic.   Nose: Nose normal.   Mouth/Throat: Oropharynx is clear and moist.   Eyes: Conjunctivae and EOM are normal. Pupils are equal, round, and reactive to light.   Neck: Normal range of motion. Neck supple. No thyromegaly present. No tracheal deviation present.   Cardiovascular: Normal rate, regular rhythm, normal heart sounds and intact distal pulses. Exam reveals no gallop and no friction rub.    No murmur heard.  Pulmonary/Chest: Breath sounds normal. No stridor. No respiratory distress. She has no rhonchi.   No adventitious sounds, coarse breath sounds   Abdominal: Soft. Bowel sounds  are normal. She exhibits no distension and no mass. There is no tenderness. There is no rebound and no guarding.   Musculoskeletal: Normal range of motion. She exhibits no edema.   Lymphadenopathy:     She has no cervical adenopathy.   Neurological: She is alert and oriented to person, place, and time. She has normal strength and normal reflexes. GCS score is 15. GCS eye subscore is 4. GCS verbal subscore is 5. GCS motor subscore is 6.   Skin: Skin is warm and dry. Capillary refill takes less than 2 seconds. Rash (Urticarial rash located to trunk upper extremities and lower extremities. No target lesions noted.) noted.   Psychiatric: She has a normal mood and affect.         ED Course   Procedures  Labs Reviewed   CBC W/ AUTO DIFFERENTIAL - Abnormal; Notable for the following components:       Result Value    Mean Corpuscular Hemoglobin 31.4 (*)     Immature Granulocytes 1.1 (*)     Immature Grans (Abs) 0.12 (*)     All other components within normal limits   COMPREHENSIVE METABOLIC PANEL - Abnormal; Notable for the following components:    BUN, Bld 36 (*)     eGFR if  59.4 (*)     eGFR if non  51.5 (*)     All other components within normal limits   TROPONIN I   B-TYPE NATRIURETIC PEPTIDE   TROPONIN I   SEDIMENTATION RATE   C-REACTIVE PROTEIN   C-REACTIVE PROTEIN   SEDIMENTATION RATE    Narrative:     Recoll. [id number] by [email or web address] at 01/04/2020 13:13, reason: QNS; spoke   with Jagdish Kennedy, ED        ECG Results          EKG 12-lead (In process)  Result time 01/04/20 09:27:42    In process by Interface, Lab In [facility] (01/04/20 09:27:42)                 Narrative:    Test Reason : R07.9,    Vent. Rate : 062 BPM     Atrial Rate : 062 BPM     P-R Int : 162 ms          QRS Dur : 070 ms      QT Int : 420 ms       P-R-T Axes : 043 002 025 degrees     QTc Int : 426 ms    Normal sinus rhythm  Normal ECG  When compared with ECG of 09-DEC-1991 16:14,  Questionable change in QRS  duration    Referred By:  MANDO           Confirmed By:                             Imaging Results          X-Ray Abdomen Flat And Erect (Final result)  Result time 01/04/20 11:53:54    Final result by Karrie Peña MD (01/04/20 11:53:54)                 Impression:      Unremarkable bowel gas pattern    Degenerative changes of the spine      Electronically signed by: Karrie Peña MD  Date:    01/04/2020  Time:    11:53             Narrative:    EXAMINATION:  XR ABDOMEN FLAT AND ERECT    CLINICAL HISTORY:  Constipation, unspecified    FINDINGS:  There is a normal bowel gas pattern.  There are no dilated bowel loops or air-fluid levels.  There is no pneumoperitoneum.    There is moderate vascular calcification.  There are degenerative changes of the spine.  Patient has had prior vertebroplasty at L1.                               X-Ray Chest AP Portable (Final result)  Result time 01/04/20 11:11:36    Final result by Karrie Peña MD (01/04/20 11:11:36)                 Impression:      Improvement of the pulmonary interstitial edema with no new infiltrates      Electronically signed by: Karrie Peña MD  Date:    01/04/2020  Time:    11:11             Narrative:    EXAMINATION:  XR CHEST AP PORTABLE    CLINICAL HISTORY:  Chest Pain;    FINDINGS:  Portable chest at 09:25 is compared to 01/30/2019 shows mild cardiomegaly.    There is improvement of the pulmonary interstitial edema.  There are no confluent infiltrates.  There are no acute osseous abnormalities.                                 Medical Decision Making:   Initial Assessment:   85-year-old female with history of arthritis, hypertension, osteoporosis, here with recurring urticarial rash over the last week.  Differential Diagnosis:   Urticaria, contact versus environmental allergy, food allergy, drug eruption  Clinical Tests:   Lab Tests: Ordered and Reviewed  Radiological Study: Ordered and Reviewed  Medical Tests: Ordered and Reviewed  ED  Management:  Patient emergency department was treated for acute urticarial reaction.  Subsequent labs were reassuring bowl.  Chest x-ray was clear.  Patient did receive Solu-Medrol, Benadryl, Pepcid with overall relief of symptoms. At this time patient is instructed to follow up with Allergy immunology doctor [facility] next week.  She is also to be discharged on prednison 50 mg daily for the next 5 days.  If her problems persist or worsen she is instructed to return to the emergency department.  She can take Benadryl as needed for itching as well. Patient at time of discharge stated overall improvement.  Patient also with history of constipation did have flattening back to abdominal x-ray which showed no evidence of obstructive bowel gas pattern.  Patient had mild stool noted throughout the colon.  She was given instructions to take magnesium citrate as needed for constipation.              Attending Attestation:         Attending Critical Care:   Critical Care Times:   Direct Patient Care (initial evaluation, reassessments, and time considering the case)................................................................45 minutes.   Additional History from reviewing old medical records or taking additional history from the family, EMS, PCP, etc.......................10 minutes.   Ordering, Reviewing, and Interpreting Diagnostic Studies...............................................................................................................10 minutes.   Documentation..................................................................................................................................................................................10 minutes.   ==============================================================  · Total Critical Care Time - exclusive of procedural time: 75 minutes.  ==============================================================  Critical care reasons: Urticaria, allergic reaction.   Critical  care was time spent personally by me on the following activities: obtaining history from patient or relative, examination of patient, review of old charts, development of treatment plan with patient or relative, ordering and performing treatments and interventions, evaluation of patient's response to treatment and re-evaluation of patient's conition.   Critical Care Condition: potentially life-threatening                             Clinical Impression:       ICD-10-CM ICD-9-CM   1. Urticaria L50.9 708.9   2. Constipation K59.00 564.00   3. Dyspnea, unspecified type R06.00 786.09                             Chavez Baker MD  01/04/20 5721

## 2020-01-04 NOTE — ED TRIAGE NOTES
"Present to the ER with family, pt's daughter Mrs. Sun reports pt has c/o pain since last night with rash since 3 days ago, pt reports pain to generalized body, stating" all night long, I didn't sleep, I hurt from the top of my head to the tip of my toes" pt's daughter reports pt had rash 3 days ago, seen by PMD and given a " shot" symptoms improved for one day, returning last night, arrived with red patches to generalized body   "

## 2020-01-05 ENCOUNTER — HOSPITAL ENCOUNTER (EMERGENCY)
Facility: HOSPITAL | Age: 85
Discharge: HOME | End: 2020-01-05
Attending: EMERGENCY MEDICINE
Payer: MEDICARE

## 2020-01-05 VITALS
TEMPERATURE: 98 F | HEIGHT: 60 IN | RESPIRATION RATE: 31 BRPM | DIASTOLIC BLOOD PRESSURE: 72 MMHG | SYSTOLIC BLOOD PRESSURE: 141 MMHG | WEIGHT: 115.5 LBS | HEART RATE: 55 BPM | OXYGEN SATURATION: 98 % | BODY MASS INDEX: 22.68 KG/M2

## 2020-01-05 DIAGNOSIS — T78.40XS ALLERGIC REACTION, SEQUELA: Primary | ICD-10-CM

## 2020-01-05 DIAGNOSIS — T17.308A CHOKING, INITIAL ENCOUNTER: ICD-10-CM

## 2020-01-05 LAB
ALBUMIN SERPL BCP-MCNC: 3.7 G/DL (ref 3.5–5.2)
ALP SERPL-CCNC: 57 U/L (ref 55–135)
ALT SERPL W/O P-5'-P-CCNC: 18 U/L (ref 10–44)
ANION GAP SERPL CALC-SCNC: 12 MMOL/L (ref 8–16)
AST SERPL-CCNC: 25 U/L (ref 10–40)
BASOPHILS # BLD AUTO: 0.01 K/UL (ref 0–0.2)
BASOPHILS NFR BLD: 0.1 % (ref 0–1.9)
BILIRUB SERPL-MCNC: 0.6 MG/DL (ref 0.1–1)
BNP SERPL-MCNC: 48 PG/ML (ref 0–99)
BUN SERPL-MCNC: 35 MG/DL (ref 8–23)
CALCIUM SERPL-MCNC: 8.8 MG/DL (ref 8.7–10.5)
CHLORIDE SERPL-SCNC: 101 MMOL/L (ref 95–110)
CO2 SERPL-SCNC: 26 MMOL/L (ref 23–29)
CREAT SERPL-MCNC: 1.1 MG/DL (ref 0.5–1.4)
DIFFERENTIAL METHOD: ABNORMAL
EOSINOPHIL # BLD AUTO: 0 K/UL (ref 0–0.5)
EOSINOPHIL NFR BLD: 0.1 % (ref 0–8)
ERYTHROCYTE [DISTWIDTH] IN BLOOD BY AUTOMATED COUNT: 13.7 % (ref 11.5–14.5)
EST. GFR  (AFRICAN AMERICAN): 52.9 ML/MIN/1.73 M^2
EST. GFR  (NON AFRICAN AMERICAN): 45.9 ML/MIN/1.73 M^2
GLUCOSE SERPL-MCNC: 120 MG/DL (ref 70–110)
HCT VFR BLD AUTO: 35.9 % (ref 37–48.5)
HGB BLD-MCNC: 11.9 G/DL (ref 12–16)
IMM GRANULOCYTES # BLD AUTO: 0.09 K/UL (ref 0–0.04)
IMM GRANULOCYTES NFR BLD AUTO: 1 % (ref 0–0.5)
INR PPP: 1.2
LYMPHOCYTES # BLD AUTO: 1 K/UL (ref 1–4.8)
LYMPHOCYTES NFR BLD: 11.2 % (ref 18–48)
MAGNESIUM SERPL-MCNC: 2.2 MG/DL (ref 1.6–2.6)
MCH RBC QN AUTO: 31.1 PG (ref 27–31)
MCHC RBC AUTO-ENTMCNC: 33.1 G/DL (ref 32–36)
MCV RBC AUTO: 94 FL (ref 82–98)
MONOCYTES # BLD AUTO: 0.5 K/UL (ref 0.3–1)
MONOCYTES NFR BLD: 5.3 % (ref 4–15)
NEUTROPHILS # BLD AUTO: 7.3 K/UL (ref 1.8–7.7)
NEUTROPHILS NFR BLD: 82.3 % (ref 38–73)
NRBC BLD-RTO: 0 /100 WBC
PLATELET # BLD AUTO: 256 K/UL (ref 150–350)
PMV BLD AUTO: 9.7 FL (ref 9.2–12.9)
POTASSIUM SERPL-SCNC: 3.5 MMOL/L (ref 3.5–5.1)
PROT SERPL-MCNC: 6.8 G/DL (ref 6–8.4)
PROTHROMBIN TIME: 14.5 SEC (ref 10.6–14.8)
RBC # BLD AUTO: 3.83 M/UL (ref 4–5.4)
SODIUM SERPL-SCNC: 139 MMOL/L (ref 136–145)
TROPONIN I SERPL DL<=0.01 NG/ML-MCNC: <0.03 NG/ML
WBC # BLD AUTO: 8.84 K/UL (ref 3.9–12.7)

## 2020-01-05 PROCEDURE — 83735 ASSAY OF MAGNESIUM: CPT

## 2020-01-05 PROCEDURE — 80053 COMPREHEN METABOLIC PANEL: CPT

## 2020-01-05 PROCEDURE — 96374 THER/PROPH/DIAG INJ IV PUSH: CPT

## 2020-01-05 PROCEDURE — 93005 ELECTROCARDIOGRAM TRACING: CPT

## 2020-01-05 PROCEDURE — S0028 INJECTION, FAMOTIDINE, 20 MG: HCPCS | Performed by: EMERGENCY MEDICINE

## 2020-01-05 PROCEDURE — 25000003 PHARM REV CODE 250: Performed by: EMERGENCY MEDICINE

## 2020-01-05 PROCEDURE — 83880 ASSAY OF NATRIURETIC PEPTIDE: CPT

## 2020-01-05 PROCEDURE — 85025 COMPLETE CBC W/AUTO DIFF WBC: CPT

## 2020-01-05 PROCEDURE — 96375 TX/PRO/DX INJ NEW DRUG ADDON: CPT

## 2020-01-05 PROCEDURE — 84484 ASSAY OF TROPONIN QUANT: CPT

## 2020-01-05 PROCEDURE — 85610 PROTHROMBIN TIME: CPT

## 2020-01-05 PROCEDURE — 99291 CRITICAL CARE FIRST HOUR: CPT | Mod: 25

## 2020-01-05 PROCEDURE — 63600175 PHARM REV CODE 636 W HCPCS: Performed by: EMERGENCY MEDICINE

## 2020-01-05 RX ORDER — METHYLPREDNISOLONE SOD SUCC 125 MG
125 VIAL (EA) INJECTION
Status: COMPLETED | OUTPATIENT
Start: 2020-01-05 | End: 2020-01-05

## 2020-01-05 RX ORDER — FAMOTIDINE 10 MG/ML
20 INJECTION INTRAVENOUS
Status: COMPLETED | OUTPATIENT
Start: 2020-01-05 | End: 2020-01-05

## 2020-01-05 RX ORDER — DIPHENHYDRAMINE HYDROCHLORIDE 50 MG/ML
25 INJECTION INTRAMUSCULAR; INTRAVENOUS
Status: COMPLETED | OUTPATIENT
Start: 2020-01-05 | End: 2020-01-05

## 2020-01-05 RX ADMIN — METHYLPREDNISOLONE SODIUM SUCCINATE 125 MG: 125 INJECTION, POWDER, FOR SOLUTION INTRAMUSCULAR; INTRAVENOUS at 09:01

## 2020-01-05 RX ADMIN — DIPHENHYDRAMINE HYDROCHLORIDE 25 MG: 50 INJECTION, SOLUTION INTRAMUSCULAR; INTRAVENOUS at 09:01

## 2020-01-05 RX ADMIN — FAMOTIDINE 20 MG: 10 INJECTION, SOLUTION INTRAVENOUS at 09:01

## 2020-01-06 NOTE — ED NOTES
Bed: 02A Trauma  Expected date:   Expected time:   Means of arrival:   Comments:  Ems choke on pills

## 2020-01-06 NOTE — ED PROVIDER NOTES
Encounter Date: 1/5/2020       History     Chief Complaint   Patient presents with    Cough     Took some pills and feels like one is stuck so she has been coughing since.      85-year-old female presented emergency department with cough.  Patient was taking some pills and choked on the pills and started coughing.  Patient was seen here yesterday for allergic reaction with refuse rash and the itching all over the body.  Patient denies any chest pain. Patient denies shortness of breath however she said she had some cough.  Patient denies fever or chills or nausea or vomiting. Patient said she usually forgets and does not remember everything.        Review of patient's allergies indicates:   Allergen Reactions    Adhesive     Arb-angiotensin receptor antagonist     Atacand [candesartan]     Bactrim [sulfamethoxazole-trimethoprim]     Codeine Hives    Penicillins Hives    Trental [pentoxifylline]      Past Medical History:   Diagnosis Date    Arthritis     Hearing loss, conductive, bilateral     High cholesterol     History of shingles     vaginal    Hypertension     Kidney artery constriction     has renal artery stent    Lumbar radiculopathy     Osteoporosis     PONV (postoperative nausea and vomiting)     PVD (peripheral vascular disease)     CHASTITY (renal artery stenosis) 11/26/2018    Stroke     of eye vasculature     Past Surgical History:   Procedure Laterality Date    ANTERIOR CERVICAL DISCECTOMY W/ FUSION      APPENDECTOMY      BLADDER SUSPENSION      CERVICAL SPINE SURGERY      COLONOSCOPY      COSMETIC SURGERY      EYE SURGERY      HYSTERECTOMY       Family History   Problem Relation Age of Onset    Arthritis Mother     Hearing loss Mother     Heart disease Mother     Hypertension Mother     Kidney disease Mother     Stroke Mother     Heart disease Father      Social History     Tobacco Use    Smoking status: Former Smoker     Packs/day: 1.00     Types: Cigarettes     Smokeless tobacco: Never Used    Tobacco comment: quit 30 yrs ago   Substance Use Topics    Alcohol use: No    Drug use: No     Review of Systems   Unable to perform ROS: Dementia   Constitutional: Negative.    HENT: Negative.    Eyes: Negative.    Respiratory: Positive for cough.    Cardiovascular: Negative.  Negative for chest pain.   Gastrointestinal: Negative.    Endocrine: Negative.    Genitourinary: Negative.    Musculoskeletal: Negative.    Skin: Positive for rash.   Allergic/Immunologic: Negative.    Neurological: Negative.    Hematological: Negative.    Psychiatric/Behavioral: The patient is nervous/anxious.    All other systems reviewed and are negative.      Physical Exam     Initial Vitals [01/05/20 1844]   BP Pulse Resp Temp SpO2   122/86 (!) 59 (!) 22 98.4 °F (36.9 °C) 97 %      MAP       --         Physical Exam    Nursing note and vitals reviewed.  Constitutional: She appears well-developed and well-nourished.   HENT:   Head: Normocephalic and atraumatic.   Nose: Nose normal.   Mouth/Throat: Oropharynx is clear and moist.   Eyes: Conjunctivae and EOM are normal. Pupils are equal, round, and reactive to light.   Neck: Normal range of motion. Neck supple. No thyromegaly present. No tracheal deviation present. No JVD present.   Cardiovascular: Normal rate, regular rhythm, normal heart sounds and intact distal pulses.   No murmur heard.  Pulmonary/Chest: No stridor. No respiratory distress. She has no wheezes. She has rales.   Bi basilar crackles   Abdominal: Soft. Bowel sounds are normal. There is no tenderness.   Musculoskeletal: Normal range of motion. She exhibits no edema or tenderness.   Neurological: She is alert. She has normal strength. No cranial nerve deficit or sensory deficit.   Oriented to place and person and has decrease recent memory   Skin: Skin is warm. Capillary refill takes less than 2 seconds. Rash noted.   Mild diffuse blanchable rash all over the body consistent with allergic  reaction   Psychiatric: She has a normal mood and affect. Thought content normal.         ED Course   Procedures  Labs Reviewed   CBC W/ AUTO DIFFERENTIAL   COMPREHENSIVE METABOLIC PANEL   TROPONIN I   B-TYPE NATRIURETIC PEPTIDE   PROTIME-INR   MAGNESIUM          Imaging Results    None          Medical Decision Making:   Differential Diagnosis:   Patient not coughing in the emergency department and not short of breath.  Patient's symptoms are likely secondary to that episode of choking.  Patient is feeling much better at this time.  Patient nontoxic and hemodynamically stable. Chest x-ray does not show any evidence of aspiration pneumonia.  Patient not hypoxic and rash improved with treatment for allergic reaction as feeling better discharged with instructions and follow-up.  Patient advised to continue home medication.  Clinical Tests:   Lab Tests: Reviewed  Radiological Study: Reviewed                                 Clinical Impression:       ICD-10-CM ICD-9-CM   1. Allergic reaction, sequela T78.40XS 909.9   2. Choking, initial encounter T17.308A 933.1                             Kelsey Houston MD  01/05/20 3466

## 2020-01-06 NOTE — PLAN OF CARE
08:53 - Called [phone] to Dr. Terri Schulte's office and spoke to Adina to request appointment for this patient.  Adina stated that 1st available appointment is February 18th at 1:20 pm. I informed her to please book for this appointment and she said that she would call if any openings become available before then.  I then called daughter of patient, Corinne ZHENG At [phone], and informed her of the appointment time as stated above scheduled for patient and that someone from Dr. Schulte's office will call if have any appointment available sooner.  Corinne ZHENG Thanked me for the appointment information.

## 2020-01-06 NOTE — ED NOTES
"Presents to ER via EMS with c/o cough since "choked on some of her medicine earlier". Some coughing noted on assessment. No c/o shortness of breath or any pain at this time. AAOx4. Hard of hearing.   "

## 2020-01-09 ENCOUNTER — OFFICE VISIT (OUTPATIENT)
Dept: GASTROENTEROLOGY | Facility: CLINIC | Age: 85
End: 2020-01-09
Payer: MEDICARE

## 2020-01-09 ENCOUNTER — OFFICE VISIT (OUTPATIENT)
Dept: ALLERGY | Facility: CLINIC | Age: 85
End: 2020-01-09
Payer: MEDICARE

## 2020-01-09 VITALS
WEIGHT: 115 LBS | HEIGHT: 60 IN | BODY MASS INDEX: 22.58 KG/M2 | SYSTOLIC BLOOD PRESSURE: 131 MMHG | DIASTOLIC BLOOD PRESSURE: 57 MMHG | HEART RATE: 50 BPM

## 2020-01-09 VITALS
BODY MASS INDEX: 23.79 KG/M2 | SYSTOLIC BLOOD PRESSURE: 118 MMHG | WEIGHT: 118 LBS | OXYGEN SATURATION: 97 % | HEART RATE: 55 BPM | DIASTOLIC BLOOD PRESSURE: 64 MMHG | HEIGHT: 59 IN

## 2020-01-09 DIAGNOSIS — R10.9 ABDOMINAL PAIN, UNSPECIFIED ABDOMINAL LOCATION: Primary | ICD-10-CM

## 2020-01-09 DIAGNOSIS — J32.4 CHRONIC PANSINUSITIS: Primary | ICD-10-CM

## 2020-01-09 DIAGNOSIS — K59.00 CONSTIPATION, UNSPECIFIED CONSTIPATION TYPE: ICD-10-CM

## 2020-01-09 DIAGNOSIS — R10.84 GENERALIZED ABDOMINAL PAIN: ICD-10-CM

## 2020-01-09 DIAGNOSIS — L50.8 ACUTE URTICARIA: Primary | ICD-10-CM

## 2020-01-09 DIAGNOSIS — I10 ESSENTIAL HYPERTENSION: ICD-10-CM

## 2020-01-09 DIAGNOSIS — K21.9 GASTROESOPHAGEAL REFLUX DISEASE, ESOPHAGITIS PRESENCE NOT SPECIFIED: ICD-10-CM

## 2020-01-09 DIAGNOSIS — R11.0 NAUSEA: ICD-10-CM

## 2020-01-09 DIAGNOSIS — L29.9 ITCHING: ICD-10-CM

## 2020-01-09 PROCEDURE — 1159F MED LIST DOCD IN RCRD: CPT | Mod: S$GLB,,, | Performed by: ALLERGY & IMMUNOLOGY

## 2020-01-09 PROCEDURE — 1125F PR PAIN SEVERITY QUANTIFIED, PAIN PRESENT: ICD-10-PCS | Mod: S$GLB,,, | Performed by: INTERNAL MEDICINE

## 2020-01-09 PROCEDURE — 1159F PR MEDICATION LIST DOCUMENTED IN MEDICAL RECORD: ICD-10-PCS | Mod: S$GLB,,, | Performed by: INTERNAL MEDICINE

## 2020-01-09 PROCEDURE — 3074F PR MOST RECENT SYSTOLIC BLOOD PRESSURE < 130 MM HG: ICD-10-PCS | Mod: S$GLB,,, | Performed by: ALLERGY & IMMUNOLOGY

## 2020-01-09 PROCEDURE — 3078F DIAST BP <80 MM HG: CPT | Mod: S$GLB,,, | Performed by: ALLERGY & IMMUNOLOGY

## 2020-01-09 PROCEDURE — 3075F SYST BP GE 130 - 139MM HG: CPT | Mod: S$GLB,,, | Performed by: INTERNAL MEDICINE

## 2020-01-09 PROCEDURE — 1101F PR PT FALLS ASSESS DOC 0-1 FALLS W/OUT INJ PAST YR: ICD-10-PCS | Mod: S$GLB,,, | Performed by: INTERNAL MEDICINE

## 2020-01-09 PROCEDURE — 1125F AMNT PAIN NOTED PAIN PRSNT: CPT | Mod: S$GLB,,, | Performed by: INTERNAL MEDICINE

## 2020-01-09 PROCEDURE — 99204 OFFICE O/P NEW MOD 45 MIN: CPT | Mod: S$GLB,,, | Performed by: ALLERGY & IMMUNOLOGY

## 2020-01-09 PROCEDURE — 1101F PT FALLS ASSESS-DOCD LE1/YR: CPT | Mod: S$GLB,,, | Performed by: INTERNAL MEDICINE

## 2020-01-09 PROCEDURE — 99215 PR OFFICE/OUTPT VISIT, EST, LEVL V, 40-54 MIN: ICD-10-PCS | Mod: S$GLB,,, | Performed by: INTERNAL MEDICINE

## 2020-01-09 PROCEDURE — 1101F PR PT FALLS ASSESS DOC 0-1 FALLS W/OUT INJ PAST YR: ICD-10-PCS | Mod: S$GLB,,, | Performed by: ALLERGY & IMMUNOLOGY

## 2020-01-09 PROCEDURE — 99215 OFFICE O/P EST HI 40 MIN: CPT | Mod: S$GLB,,, | Performed by: INTERNAL MEDICINE

## 2020-01-09 PROCEDURE — 1159F MED LIST DOCD IN RCRD: CPT | Mod: S$GLB,,, | Performed by: INTERNAL MEDICINE

## 2020-01-09 PROCEDURE — 3078F PR MOST RECENT DIASTOLIC BLOOD PRESSURE < 80 MM HG: ICD-10-PCS | Mod: S$GLB,,, | Performed by: ALLERGY & IMMUNOLOGY

## 2020-01-09 PROCEDURE — 3078F PR MOST RECENT DIASTOLIC BLOOD PRESSURE < 80 MM HG: ICD-10-PCS | Mod: S$GLB,,, | Performed by: INTERNAL MEDICINE

## 2020-01-09 PROCEDURE — 1159F PR MEDICATION LIST DOCUMENTED IN MEDICAL RECORD: ICD-10-PCS | Mod: S$GLB,,, | Performed by: ALLERGY & IMMUNOLOGY

## 2020-01-09 PROCEDURE — 99204 PR OFFICE/OUTPT VISIT, NEW, LEVL IV, 45-59 MIN: ICD-10-PCS | Mod: S$GLB,,, | Performed by: ALLERGY & IMMUNOLOGY

## 2020-01-09 PROCEDURE — 3074F SYST BP LT 130 MM HG: CPT | Mod: S$GLB,,, | Performed by: ALLERGY & IMMUNOLOGY

## 2020-01-09 PROCEDURE — 3075F PR MOST RECENT SYSTOLIC BLOOD PRESS GE 130-139MM HG: ICD-10-PCS | Mod: S$GLB,,, | Performed by: INTERNAL MEDICINE

## 2020-01-09 PROCEDURE — 1101F PT FALLS ASSESS-DOCD LE1/YR: CPT | Mod: S$GLB,,, | Performed by: ALLERGY & IMMUNOLOGY

## 2020-01-09 PROCEDURE — 3078F DIAST BP <80 MM HG: CPT | Mod: S$GLB,,, | Performed by: INTERNAL MEDICINE

## 2020-01-09 RX ORDER — MORPHINE SULFATE 15 MG/1
TABLET, FILM COATED, EXTENDED RELEASE ORAL
COMMUNITY
Start: 2019-11-21 | End: 2020-01-09

## 2020-01-09 RX ORDER — HYDROXYZINE HYDROCHLORIDE 25 MG/1
25 TABLET, FILM COATED ORAL 3 TIMES DAILY PRN
COMMUNITY
End: 2020-05-12 | Stop reason: CLARIF

## 2020-01-09 RX ORDER — LEVOCETIRIZINE DIHYDROCHLORIDE 5 MG/1
5 TABLET, FILM COATED ORAL 2 TIMES DAILY
Qty: 60 TABLET | Refills: 3 | Status: SHIPPED | OUTPATIENT
Start: 2020-01-09 | End: 2020-05-12 | Stop reason: CLARIF

## 2020-01-09 RX ORDER — HYDROCODONE BITARTRATE AND ACETAMINOPHEN 7.5; 325 MG/1; MG/1
TABLET ORAL
COMMUNITY
Start: 2019-11-21 | End: 2020-05-12 | Stop reason: CLARIF

## 2020-01-09 RX ORDER — TRIAMCINOLONE ACETONIDE 1 MG/G
OINTMENT TOPICAL 2 TIMES DAILY
Qty: 80 G | Refills: 3 | Status: SHIPPED | OUTPATIENT
Start: 2020-01-09 | End: 2020-05-12 | Stop reason: CLARIF

## 2020-01-09 NOTE — PROGRESS NOTES
[facility] Established Patient Visit    Subjective:           PCP: Marcia Calzada    Chief Complaint: Follow-up (f/u ER visit Constipation, rectal pain. Was ER 1.4.2020-1.5.2020. )       HPI:  Jessica Naidu is a 85 y.o. female here for itching, nausea, constipation, and vague abdominal pain. Patient also has sinus problems. She has some problem with dysphagia. Patient has hives allergy to penicillin and codeine. She denies any history of fever, chills, or rigors. There are no urinary problems. She has a chronic right sinus drip. Would get an ultrasound of the gallbladder and CT scan the abdomen and pelvis with oral but no IV contrast. Will treat patient with MiraLax and see what happens. She is going to see Dr. Terri ayon, the immunologist. She feels dizzy but does not have any localizing signs.     ROS:   Constitutional: No fevers, chills, weight loss  ENT: No allergies, sore throat, rhinorrhea  CV: No chest pain, palpitations, edema  Pulm: No cough, shortness of breath, wheezing  Ophtho: No vision changes  GI: No blood in stools, change in bowel habits, nausea/vomiting  Denies alternating diarrhea/constipation.   Derm: No rash  Heme: No easy bruising or lymphadenopathy  MSK: No arthralgias or myalgias  : No dysuria, hematuria, frequency, polyuria, or flank tenderness  Endo: No hot or cold intolerance  Neuro: No syncope or seizure, or dizziness  Psych: No hallucination, depression or suicidal ideation      Medical History:  has a past medical history of Arthritis, Hearing loss, conductive, bilateral, High cholesterol, History of shingles, Hypertension, Kidney artery constriction, Lumbar radiculopathy, Osteoporosis, PONV (postoperative nausea and vomiting), PVD (peripheral vascular disease), CHASTITY (renal artery stenosis) (11/26/2018), and Stroke.      Surgical History:  has a past surgical history that includes Hysterectomy; Appendectomy; Bladder suspension; Anterior cervical discectomy w/  fusion; Colonoscopy; Eye surgery; Cosmetic surgery; and Cervical spine surgery.    Family History:   Family History   Problem Relation Age of Onset    Arthritis Mother     Hearing loss Mother     Heart disease Mother     Hypertension Mother     Kidney disease Mother     Stroke Mother     Heart disease Father        Social History:   Social History     Tobacco Use    Smoking status: Former Smoker     Packs/day: 1.00     Types: Cigarettes    Smokeless tobacco: Never Used    Tobacco comment: quit 30 yrs ago   Substance Use Topics    Alcohol use: No    Drug use: No       The patient's social and family histories were reviewed by me and updated in the appropriate section of the electronic medical record.    Allergies:   Review of patient's allergies indicates:   Allergen Reactions    Adhesive     Arb-angiotensin receptor antagonist     Atacand [candesartan]     Bactrim [sulfamethoxazole-trimethoprim]     Codeine Hives    Penicillins Hives    Trental [pentoxifylline]          Medications:   Current Outpatient Medications   Medication Sig Dispense Refill    alendronate (FOSAMAX) 70 MG tablet Take 1 tablet (70 mg total) by mouth every 7 days. 12 tablet 1    atorvastatin (LIPITOR) 10 MG tablet Take 1 tablet (10 mg total) by mouth once daily. (Patient taking differently: Take 10 mg by mouth every evening. ) 90 tablet 1    brimonidine 0.1% (ALPHAGAN P) 0.1 % Drop Place 1 drop into both eyes 3 (three) times daily. 15 mL 1    calcitRIOL (ROCALTROL) 0.25 MCG Cap TAKE 1 CAPSULE EVERY DAY (Patient taking differently: Take 0.25 mcg by mouth once daily. ) 90 capsule 1    cholecalciferol, vitamin D3, 50,000 unit capsule Take 1 capsule (50,000 Units total) by mouth every 7 days. 12 capsule 0    cilostazol (PLETAL) 50 MG Tab Take 2 tablets (100 mg total) by mouth 2 (two) times daily. 360 tablet 1    diltiaZEM (CARDIZEM CD) 360 MG 24 hr capsule Take 1 capsule (360 mg total) by mouth once daily. 90 capsule 1     donepezil (ARICEPT) 10 MG tablet Take 1 tablet (10 mg total) by mouth once daily. 90 tablet 1    ipratropium (ATROVENT) 42 mcg (0.06 %) nasal spray 1 spray by Nasal route 3 (three) times daily as needed.       memantine (NAMENDA) 10 MG Tab Take 1 tablet (10 mg total) by mouth 2 (two) times daily. 180 tablet 1    omeprazole (PRILOSEC) 40 MG capsule Take 1 capsule (40 mg total) by mouth once daily. 90 capsule 1    predniSONE (DELTASONE) 50 MG Tab Take 1 tablet (50 mg total) by mouth once daily. 5 tablet 0    trimethoprim (TRIMPEX) 100 mg Tab Take 1 tablet (100 mg total) by mouth every evening. 42 tablet 6    aspirin 81 MG Chew Take 1 tablet (81 mg total) by mouth once daily.  0    camphor-menthol 0.5-0.5% (SARNA ORIGINAL) lotion Apply topically as needed for Itching. 222 mL 3    HYDROcodone-acetaminophen (NORCO) 7.5-325 mg per tablet       hydrOXYzine HCl (ATARAX) 25 MG tablet Take 25 mg by mouth 3 (three) times daily as needed for Itching.      levocetirizine (XYZAL) 5 MG tablet Take 1 tablet (5 mg total) by mouth 2 (two) times daily. 60 tablet 3    ranitidine (ZANTAC) 150 MG tablet Take 1 tablet (150 mg total) by mouth 2 (two) times daily. 60 tablet 2    triamcinolone acetonide 0.1% (KENALOG) 0.1 % ointment Apply topically 2 (two) times daily. 80 g 3     No current facility-administered medications for this visit.      All medications and past history have been reviewed.        Objective:        Vital Signs:  Blood pressure (!) 131/57, pulse (!) 50, height 5' (1.524 m), weight 52.2 kg (115 lb). Body mass index is 22.46 kg/m².    Physical Exam:   General Appearance: Well appearing in no acute distress, well developed, well                nourished  Head: Normocephalic, without obvious abnormality  Eyes:  Pupils equal, round and reactive to light  Throat: Lips, mucosa, and tongue normal; teeth and gums normal  Lungs: CTA bilaterally in anterior and posterior fields, no wheezes, no crackles  Heart:  Regular  rate and rhythm, no murmurs heard  Abdomen: Soft, non tender, non distended with positive bowel sounds in all four quadrants. No hepatosplenomegaly, ascites, or mass. Negative for succusion splash  : female   Extremities: No cyanosis, edema or deformity  Skin: No rash  Neurologic: Normal exam    Labs:  Lab Results   Component Value Date    WBC 8.84 01/05/2020    HGB 11.9 (L) 01/05/2020    HCT 35.9 (L) 01/05/2020     01/05/2020    ALT 18 01/05/2020    AST 25 01/05/2020     01/05/2020    K 3.5 01/05/2020     01/05/2020    CREATININE 1.1 01/05/2020    BUN 35 (H) 01/05/2020    CO2 26 01/05/2020    INR 1.2 01/05/2020    HGBA1C 5.7 03/19/2019       Imaging:     All lab results and imaging results have been reviewed and discussed with the patient      Assessment:       1. Abdominal pain, unspecified abdominal location    2. Generalized abdominal pain    3. Itching    4. Nausea    5. Constipation, unspecified constipation type    6. Gastroesophageal reflux disease, esophagitis presence not specified    7. Essential hypertension        Plan:       Jessica was seen today for follow-up.    Diagnoses and all orders for this visit:    Abdominal pain, unspecified abdominal location    Generalized abdominal pain  -     US Abdomen Limited; Future  -     CT Abdomen Pelvis W Wo Contrast; Future    Itching    Nausea    Constipation, unspecified constipation type    Gastroesophageal reflux disease, esophagitis presence not specified    Essential hypertension        See HPI    Follow up with CT results .    The plan was discussed with the patient and all questions/concerns have been answered to the patient's satisfaction.        Electronically signed by Arabella Russ MD    This note was dictated using voice recognition software and may contain grammatical errors.

## 2020-01-09 NOTE — PROGRESS NOTES
"Subjective:       Patient ID: Jessica Naidu is a 85 y.o. female.    Chief Complaint: Urticaria (Dr. Houston/[facility] ER referred. Wednesday last week itching in sleep started, woke up with hives Thursday, went to PCP got steroid injection-persisted, so to ER Saturday. Tx with IV steroid/benadryl-d/c home with prednisone and hydroxizine (of daughters))    HPI     Pt presents as a consult for urticaria.   Onset last week 1/4/2020  Taken to ED and given steroids, h1, h2  Pt states woke up Wednesday night and she was having pruritus.   No new dx , no new procedures, no new ingestants.   frequ daily for the past week.   She had a steroid injection Saturday and Thursday at pcp.  She has been tapering prednisone and benadryl and hydroxyzine.           Review of Systems      General: neg unexpected weight changes, fevers, chills, night sweats, malaise  HEENT: see hpi, Neg eye pain, vision changes, ear drainage, nose bleeds, throat tightness, sores in the mouth  CV: Neg chest pain, palpitations, swelling  Resp: see hpi, neg shortness of breath, hemoptysis, cough  GI: see hpi, neg dysphagia, night abdominal pain, reflux, chronic diarrhea, chronic constipation  Derm: See Hpi, neg new rash, neg flushing  Mu/sk: Neg joint pain, joint swelling   Psych: Neg anxiety  neuro: neg chronic headaches, muscle weakness  Endo: neg heat/cold intolerance, chronic fatigue    Objective:     Vitals:    01/09/20 1453   BP: 118/64   Pulse: (!) 55   SpO2: 97%   Weight: 53.5 kg (118 lb)   Height: 4' 11" (1.499 m)        Physical Exam        General: no acute distress, well developed well nourished   HEENT:   Head:normocephalic atraumatic  Eyes: DOLORES, EOMI, Neg injection, scleral icterus, or conjunctival papillary hypertrophy.  Ears: tm clear bilaterally, normal canal  Nose: nares patent   OP: mucus membranes moist, - cobblestoning, - PND, neg erythema or lesions- wearing   Neck: supple, Full range of motion, neg lymphadenopathy  Chest: full " respiratory excursion no abnormal chest abnormality  Resp: crackles in anterior and posterior right crackles.   CV: RRR, neg MRG, brisk capillary refill  Abdomen: BS+, non tender, non distended  Ext:  Neg clubbing, cyanosis, pitting edema  Skin: bruises on bilateral arms. Reviewed pic on phone.   Lymph: neg supraclavicular, axillary     Assessment:       1. Acute urticaria        Plan:       Acute urticaria  -     levocetirizine (XYZAL) 5 MG tablet; Take 1 tablet (5 mg total) by mouth 2 (two) times daily.  Dispense: 60 tablet; Refill: 3  -     ranitidine (ZANTAC) 150 MG tablet; Take 1 tablet (150 mg total) by mouth 2 (two) times daily.  Dispense: 60 tablet; Refill: 2  -     triamcinolone acetonide 0.1% (KENALOG) 0.1 % ointment; Apply topically 2 (two) times daily.  Dispense: 80 g; Refill: 3  -     camphor-menthol 0.5-0.5% (SARNA ORIGINAL) lotion; Apply topically as needed for Itching.  Dispense: 222 mL; Refill: 3      Discussed urticarial action plan.   Brooke 0.1%  sarna   High dose antihistamines  Reviewed labs     Fu in 6 weeks         Terri Schulte M.D.  Allergy/Immunology  [facility]   [phone] phone  [phone] fax

## 2020-01-09 NOTE — PATIENT INSTRUCTIONS
Follow action plan   Drink lots of water, luke warm baths, stress will make hives worse. Diet is important.     F/u in 6 weeks

## 2020-05-12 ENCOUNTER — HOSPITAL ENCOUNTER (OUTPATIENT)
Facility: HOSPITAL | Age: 85
LOS: 1 days | Discharge: HOME HEALTH | End: 2020-05-13
Attending: EMERGENCY MEDICINE | Admitting: INTERNAL MEDICINE
Payer: MEDICARE

## 2020-05-12 DIAGNOSIS — J44.1 COPD EXACERBATION: Primary | ICD-10-CM

## 2020-05-12 DIAGNOSIS — R06.02 SOB (SHORTNESS OF BREATH): ICD-10-CM

## 2020-05-12 PROBLEM — J40 BRONCHITIS: Status: ACTIVE | Noted: 2020-05-12

## 2020-05-12 LAB
ALBUMIN SERPL BCP-MCNC: 3.7 G/DL (ref 3.5–5.2)
ALLENS TEST: ABNORMAL
ALLENS TEST: ABNORMAL
ALP SERPL-CCNC: 61 U/L (ref 55–135)
ALT SERPL W/O P-5'-P-CCNC: 16 U/L (ref 10–44)
ANION GAP SERPL CALC-SCNC: 8 MMOL/L (ref 8–16)
AST SERPL-CCNC: 25 U/L (ref 10–40)
BASOPHILS # BLD AUTO: 0.04 K/UL (ref 0–0.2)
BASOPHILS NFR BLD: 0.5 % (ref 0–1.9)
BILIRUB SERPL-MCNC: 0.8 MG/DL (ref 0.1–1)
BNP SERPL-MCNC: 48 PG/ML (ref 0–99)
BUN SERPL-MCNC: 19 MG/DL (ref 8–23)
CALCIUM SERPL-MCNC: 9.2 MG/DL (ref 8.7–10.5)
CHLORIDE SERPL-SCNC: 103 MMOL/L (ref 95–110)
CK SERPL-CCNC: 43 U/L (ref 20–180)
CO2 SERPL-SCNC: 28 MMOL/L (ref 23–29)
CREAT SERPL-MCNC: 0.8 MG/DL (ref 0.5–1.4)
CRP SERPL-MCNC: 0.04 MG/DL
DELSYS: ABNORMAL
DELSYS: ABNORMAL
DIFFERENTIAL METHOD: ABNORMAL
EOSINOPHIL # BLD AUTO: 0.1 K/UL (ref 0–0.5)
EOSINOPHIL NFR BLD: 1.1 % (ref 0–8)
ERYTHROCYTE [DISTWIDTH] IN BLOOD BY AUTOMATED COUNT: 12.5 % (ref 11.5–14.5)
EST. GFR  (AFRICAN AMERICAN): >60 ML/MIN/1.73 M^2
EST. GFR  (NON AFRICAN AMERICAN): >60 ML/MIN/1.73 M^2
FERRITIN SERPL-MCNC: 26 NG/ML (ref 20–300)
GLUCOSE SERPL-MCNC: 140 MG/DL (ref 70–110)
GLUCOSE SERPL-MCNC: 175 MG/DL (ref 70–110)
HCO3 UR-SCNC: 26.6 MMOL/L (ref 24–28)
HCO3 UR-SCNC: 27.3 MMOL/L (ref 24–28)
HCT VFR BLD AUTO: 38.9 % (ref 37–48.5)
HGB BLD-MCNC: 12.8 G/DL (ref 12–16)
IMM GRANULOCYTES # BLD AUTO: 0.04 K/UL (ref 0–0.04)
IMM GRANULOCYTES NFR BLD AUTO: 0.5 % (ref 0–0.5)
INR PPP: 1.1
LACTATE SERPL-SCNC: 1.1 MMOL/L (ref 0.5–1.9)
LDH SERPL L TO P-CCNC: 195 U/L (ref 110–260)
LYMPHOCYTES # BLD AUTO: 1 K/UL (ref 1–4.8)
LYMPHOCYTES NFR BLD: 12.3 % (ref 18–48)
MCH RBC QN AUTO: 31.8 PG (ref 27–31)
MCHC RBC AUTO-ENTMCNC: 32.9 G/DL (ref 32–36)
MCV RBC AUTO: 97 FL (ref 82–98)
MONOCYTES # BLD AUTO: 0.6 K/UL (ref 0.3–1)
MONOCYTES NFR BLD: 7.5 % (ref 4–15)
NEUTROPHILS # BLD AUTO: 6.3 K/UL (ref 1.8–7.7)
NEUTROPHILS NFR BLD: 78.1 % (ref 38–73)
NRBC BLD-RTO: 0 /100 WBC
PCO2 BLDA: 35.1 MMHG (ref 35–45)
PCO2 BLDA: 40.2 MMHG (ref 35–45)
PH SMN: 7.44 [PH] (ref 7.35–7.45)
PH SMN: 7.49 [PH] (ref 7.35–7.45)
PLATELET # BLD AUTO: 283 K/UL (ref 150–350)
PMV BLD AUTO: 9.5 FL (ref 9.2–12.9)
PO2 BLDA: 102 MMHG (ref 80–100)
PO2 BLDA: 41 MMHG (ref 80–100)
POC BE: 3 MMOL/L
POC BE: 3 MMOL/L
POC SATURATED O2: 79 % (ref 95–100)
POC SATURATED O2: 98 % (ref 95–100)
POC TCO2: 28 MMOL/L (ref 23–27)
POC TCO2: 29 MMOL/L (ref 23–27)
POTASSIUM SERPL-SCNC: 3.7 MMOL/L (ref 3.5–5.1)
PROCALCITONIN SERPL IA-MCNC: <0.05 NG/ML (ref 0–0.5)
PROT SERPL-MCNC: 6.9 G/DL (ref 6–8.4)
PROTHROMBIN TIME: 13.4 SEC (ref 10.6–14.8)
RBC # BLD AUTO: 4.02 M/UL (ref 4–5.4)
SAMPLE: ABNORMAL
SAMPLE: ABNORMAL
SARS-COV-2 RDRP RESP QL NAA+PROBE: NEGATIVE
SITE: ABNORMAL
SITE: ABNORMAL
SODIUM SERPL-SCNC: 139 MMOL/L (ref 136–145)
TROPONIN I SERPL DL<=0.01 NG/ML-MCNC: <0.03 NG/ML
WBC # BLD AUTO: 8.05 K/UL (ref 3.9–12.7)

## 2020-05-12 PROCEDURE — G0378 HOSPITAL OBSERVATION PER HR: HCPCS | Mod: CS

## 2020-05-12 PROCEDURE — 85025 COMPLETE CBC W/AUTO DIFF WBC: CPT | Mod: 59

## 2020-05-12 PROCEDURE — 25000242 PHARM REV CODE 250 ALT 637 W/ HCPCS: Performed by: EMERGENCY MEDICINE

## 2020-05-12 PROCEDURE — 96375 TX/PRO/DX INJ NEW DRUG ADDON: CPT

## 2020-05-12 PROCEDURE — 99900035 HC TECH TIME PER 15 MIN (STAT)

## 2020-05-12 PROCEDURE — 80053 COMPREHEN METABOLIC PANEL: CPT

## 2020-05-12 PROCEDURE — 84145 PROCALCITONIN (PCT): CPT

## 2020-05-12 PROCEDURE — 36600 WITHDRAWAL OF ARTERIAL BLOOD: CPT

## 2020-05-12 PROCEDURE — 85610 PROTHROMBIN TIME: CPT

## 2020-05-12 PROCEDURE — 82803 BLOOD GASES ANY COMBINATION: CPT

## 2020-05-12 PROCEDURE — 36415 COLL VENOUS BLD VENIPUNCTURE: CPT

## 2020-05-12 PROCEDURE — G0378 HOSPITAL OBSERVATION PER HR: HCPCS

## 2020-05-12 PROCEDURE — 63600175 PHARM REV CODE 636 W HCPCS: Performed by: EMERGENCY MEDICINE

## 2020-05-12 PROCEDURE — 99285 EMERGENCY DEPT VISIT HI MDM: CPT | Mod: 25

## 2020-05-12 PROCEDURE — 94644 CONT INHLJ TX 1ST HOUR: CPT

## 2020-05-12 PROCEDURE — 25000003 PHARM REV CODE 250: Performed by: EMERGENCY MEDICINE

## 2020-05-12 PROCEDURE — 25000003 PHARM REV CODE 250: Performed by: INTERNAL MEDICINE

## 2020-05-12 PROCEDURE — 94761 N-INVAS EAR/PLS OXIMETRY MLT: CPT | Mod: XB

## 2020-05-12 PROCEDURE — 83880 ASSAY OF NATRIURETIC PEPTIDE: CPT

## 2020-05-12 PROCEDURE — 96374 THER/PROPH/DIAG INJ IV PUSH: CPT

## 2020-05-12 PROCEDURE — 82728 ASSAY OF FERRITIN: CPT | Mod: 59

## 2020-05-12 PROCEDURE — 84484 ASSAY OF TROPONIN QUANT: CPT

## 2020-05-12 PROCEDURE — 93005 ELECTROCARDIOGRAM TRACING: CPT | Performed by: INTERNAL MEDICINE

## 2020-05-12 PROCEDURE — 86140 C-REACTIVE PROTEIN: CPT | Mod: 59

## 2020-05-12 PROCEDURE — 82550 ASSAY OF CK (CPK): CPT | Mod: 59

## 2020-05-12 PROCEDURE — 63600175 PHARM REV CODE 636 W HCPCS: Performed by: INTERNAL MEDICINE

## 2020-05-12 PROCEDURE — U0002 COVID-19 LAB TEST NON-CDC: HCPCS

## 2020-05-12 PROCEDURE — 83615 LACTATE (LD) (LDH) ENZYME: CPT

## 2020-05-12 PROCEDURE — 83605 ASSAY OF LACTIC ACID: CPT | Mod: 59

## 2020-05-12 RX ORDER — IPRATROPIUM BROMIDE 0.5 MG/2.5ML
1 SOLUTION RESPIRATORY (INHALATION)
Status: COMPLETED | OUTPATIENT
Start: 2020-05-12 | End: 2020-05-12

## 2020-05-12 RX ORDER — SODIUM CHLORIDE 0.9 % (FLUSH) 0.9 %
10 SYRINGE (ML) INJECTION
Status: DISCONTINUED | OUTPATIENT
Start: 2020-05-12 | End: 2020-05-13 | Stop reason: HOSPADM

## 2020-05-12 RX ORDER — BENZONATATE 100 MG/1
100 CAPSULE ORAL 3 TIMES DAILY PRN
Status: DISCONTINUED | OUTPATIENT
Start: 2020-05-12 | End: 2020-05-13 | Stop reason: HOSPADM

## 2020-05-12 RX ORDER — DONEPEZIL HYDROCHLORIDE 5 MG/1
10 TABLET, FILM COATED ORAL DAILY
Status: DISCONTINUED | OUTPATIENT
Start: 2020-05-13 | End: 2020-05-13 | Stop reason: HOSPADM

## 2020-05-12 RX ORDER — ACETAMINOPHEN 325 MG/1
650 TABLET ORAL EVERY 4 HOURS PRN
Status: DISCONTINUED | OUTPATIENT
Start: 2020-05-12 | End: 2020-05-13 | Stop reason: HOSPADM

## 2020-05-12 RX ORDER — PANTOPRAZOLE SODIUM 40 MG/1
40 TABLET, DELAYED RELEASE ORAL
Status: DISCONTINUED | OUTPATIENT
Start: 2020-05-13 | End: 2020-05-13 | Stop reason: HOSPADM

## 2020-05-12 RX ORDER — IPRATROPIUM BROMIDE AND ALBUTEROL SULFATE 2.5; .5 MG/3ML; MG/3ML
3 SOLUTION RESPIRATORY (INHALATION) EVERY 6 HOURS
Status: DISCONTINUED | OUTPATIENT
Start: 2020-05-12 | End: 2020-05-13

## 2020-05-12 RX ORDER — METHYLPREDNISOLONE SOD SUCC 125 MG
125 VIAL (EA) INJECTION
Status: DISCONTINUED | OUTPATIENT
Start: 2020-05-12 | End: 2020-05-12

## 2020-05-12 RX ORDER — CYCLOSPORINE 0.5 MG/ML
1 EMULSION OPHTHALMIC 2 TIMES DAILY
COMMUNITY

## 2020-05-12 RX ORDER — MEMANTINE HYDROCHLORIDE 5 MG/1
10 TABLET ORAL 2 TIMES DAILY
Status: DISCONTINUED | OUTPATIENT
Start: 2020-05-12 | End: 2020-05-13 | Stop reason: HOSPADM

## 2020-05-12 RX ORDER — SODIUM CHLORIDE, SODIUM LACTATE, POTASSIUM CHLORIDE, CALCIUM CHLORIDE 600; 310; 30; 20 MG/100ML; MG/100ML; MG/100ML; MG/100ML
INJECTION, SOLUTION INTRAVENOUS CONTINUOUS
Status: DISCONTINUED | OUTPATIENT
Start: 2020-05-12 | End: 2020-05-13 | Stop reason: HOSPADM

## 2020-05-12 RX ORDER — LEVALBUTEROL 1.25 MG/.5ML
1.25 SOLUTION, CONCENTRATE RESPIRATORY (INHALATION)
Status: COMPLETED | OUTPATIENT
Start: 2020-05-12 | End: 2020-05-12

## 2020-05-12 RX ORDER — ALBUTEROL SULFATE 0.83 MG/ML
2.5 SOLUTION RESPIRATORY (INHALATION) EVERY 4 HOURS PRN
Status: DISCONTINUED | OUTPATIENT
Start: 2020-05-12 | End: 2020-05-13 | Stop reason: HOSPADM

## 2020-05-12 RX ORDER — POLYETHYLENE GLYCOL 3350 17 G/17G
17 POWDER, FOR SOLUTION ORAL 2 TIMES DAILY PRN
Status: DISCONTINUED | OUTPATIENT
Start: 2020-05-12 | End: 2020-05-13 | Stop reason: HOSPADM

## 2020-05-12 RX ORDER — CALCITRIOL 0.25 UG/1
0.25 CAPSULE ORAL DAILY
Status: DISCONTINUED | OUTPATIENT
Start: 2020-05-13 | End: 2020-05-13 | Stop reason: HOSPADM

## 2020-05-12 RX ORDER — ONDANSETRON 2 MG/ML
4 INJECTION INTRAMUSCULAR; INTRAVENOUS
Status: ACTIVE | OUTPATIENT
Start: 2020-05-12 | End: 2020-05-13

## 2020-05-12 RX ORDER — ALENDRONATE SODIUM 35 MG/1
70 TABLET ORAL
Status: DISCONTINUED | OUTPATIENT
Start: 2020-05-16 | End: 2020-05-13 | Stop reason: HOSPADM

## 2020-05-12 RX ORDER — ENOXAPARIN SODIUM 100 MG/ML
40 INJECTION SUBCUTANEOUS EVERY 24 HOURS
Status: DISCONTINUED | OUTPATIENT
Start: 2020-05-12 | End: 2020-05-13 | Stop reason: HOSPADM

## 2020-05-12 RX ORDER — AZITHROMYCIN 250 MG/1
500 TABLET, FILM COATED ORAL DAILY
Status: DISCONTINUED | OUTPATIENT
Start: 2020-05-13 | End: 2020-05-13

## 2020-05-12 RX ORDER — TALC
9 POWDER (GRAM) TOPICAL NIGHTLY PRN
Status: DISCONTINUED | OUTPATIENT
Start: 2020-05-12 | End: 2020-05-13 | Stop reason: HOSPADM

## 2020-05-12 RX ORDER — DILTIAZEM HYDROCHLORIDE 180 MG/1
360 CAPSULE, COATED, EXTENDED RELEASE ORAL DAILY
Status: DISCONTINUED | OUTPATIENT
Start: 2020-05-13 | End: 2020-05-13

## 2020-05-12 RX ORDER — METHYLPREDNISOLONE SOD SUCC 125 MG
125 VIAL (EA) INJECTION
Status: COMPLETED | OUTPATIENT
Start: 2020-05-12 | End: 2020-05-12

## 2020-05-12 RX ORDER — ONDANSETRON 4 MG/1
4 TABLET, ORALLY DISINTEGRATING ORAL
Status: DISCONTINUED | OUTPATIENT
Start: 2020-05-12 | End: 2020-05-12

## 2020-05-12 RX ORDER — HYDRALAZINE HYDROCHLORIDE 20 MG/ML
5 INJECTION INTRAMUSCULAR; INTRAVENOUS
Status: COMPLETED | OUTPATIENT
Start: 2020-05-12 | End: 2020-05-12

## 2020-05-12 RX ORDER — BRIMONIDINE TARTRATE 1.5 MG/ML
1 SOLUTION/ DROPS OPHTHALMIC 3 TIMES DAILY
Status: DISCONTINUED | OUTPATIENT
Start: 2020-05-12 | End: 2020-05-13 | Stop reason: HOSPADM

## 2020-05-12 RX ORDER — IBUPROFEN 800 MG/1
800 TABLET ORAL DAILY PRN
COMMUNITY

## 2020-05-12 RX ADMIN — MEMANTINE HYDROCHLORIDE 10 MG: 5 TABLET ORAL at 09:05

## 2020-05-12 RX ADMIN — ONDANSETRON 4 MG: 4 TABLET, ORALLY DISINTEGRATING ORAL at 03:05

## 2020-05-12 RX ADMIN — IPRATROPIUM BROMIDE 1 MG: 0.5 SOLUTION RESPIRATORY (INHALATION) at 04:05

## 2020-05-12 RX ADMIN — LEVALBUTEROL HYDROCHLORIDE 1.25 MG: 1.25 SOLUTION, CONCENTRATE RESPIRATORY (INHALATION) at 04:05

## 2020-05-12 RX ADMIN — METHYLPREDNISOLONE SODIUM SUCCINATE 125 MG: 125 INJECTION, POWDER, FOR SOLUTION INTRAMUSCULAR; INTRAVENOUS at 03:05

## 2020-05-12 RX ADMIN — HYDRALAZINE HYDROCHLORIDE 5 MG: 20 INJECTION INTRAMUSCULAR; INTRAVENOUS at 02:05

## 2020-05-12 RX ADMIN — ENOXAPARIN SODIUM 40 MG: 100 INJECTION SUBCUTANEOUS at 09:05

## 2020-05-12 RX ADMIN — SODIUM CHLORIDE, SODIUM LACTATE, POTASSIUM CHLORIDE, AND CALCIUM CHLORIDE: .6; .31; .03; .02 INJECTION, SOLUTION INTRAVENOUS at 08:05

## 2020-05-12 RX ADMIN — BRIMONIDINE TARTRATE 1 DROP: 1.5 SOLUTION OPHTHALMIC at 09:05

## 2020-05-12 NOTE — ED PROVIDER NOTES
Encounter Date: 5/12/2020       History     Chief Complaint   Patient presents with    Shortness of Breath    NOT FEELING GOOD     ONSET SAT     Patient here with reported shortness of breath, increasing cough increasing sputum production general malaise and weakness no fever chills no nausea  or diarrhea patient does have chronic back pain which is exacerbated with the extensive coughing she has had 1 episode of posttussive emesis following coughing exacerbations during the week however none today the patient sees Dr. Jarrett has had a chronic cough for many years there has been no sick contacts at home        Review of patient's allergies indicates:   Allergen Reactions    Adhesive     Arb-angiotensin receptor antagonist     Atacand [candesartan]     Bactrim [sulfamethoxazole-trimethoprim]     Codeine Hives    Penicillins Hives    Trental [pentoxifylline]      Past Medical History:   Diagnosis Date    Arthritis     Hearing loss, conductive, bilateral     High cholesterol     History of shingles     vaginal    Hypertension     Kidney artery constriction     has renal artery stent    Lumbar radiculopathy     Osteoporosis     PONV (postoperative nausea and vomiting)     PVD (peripheral vascular disease)     CHASTITY (renal artery stenosis) 11/26/2018    Stroke     of eye vasculature     Past Surgical History:   Procedure Laterality Date    ANTERIOR CERVICAL DISCECTOMY W/ FUSION      APPENDECTOMY      BLADDER SUSPENSION      CERVICAL SPINE SURGERY      COLONOSCOPY      COSMETIC SURGERY      EYE SURGERY      HYSTERECTOMY       Family History   Problem Relation Age of Onset    Arthritis Mother     Hearing loss Mother     Heart disease Mother     Hypertension Mother     Kidney disease Mother     Stroke Mother     Heart disease Father      Social History     Tobacco Use    Smoking status: Former Smoker     Packs/day: 1.00     Types: Cigarettes    Smokeless tobacco: Never Used    Tobacco  comment: quit 30 yrs ago   Substance Use Topics    Alcohol use: No    Drug use: No     Review of Systems   Unable to perform ROS: Dementia       Physical Exam     Initial Vitals [05/12/20 1247]   BP Pulse Resp Temp SpO2   (!) 191/84 (!) 53 (!) 22 98.4 °F (36.9 °C) 97 %      MAP       --         Physical Exam    Constitutional: She appears well-developed and well-nourished. No distress.   HENT:   Head: Normocephalic and atraumatic.   Right Ear: External ear normal.   Left Ear: External ear normal.   Mouth/Throat: Oropharynx is clear and moist.   Eyes: Conjunctivae and EOM are normal. Pupils are equal, round, and reactive to light.   Neck: Normal range of motion. Neck supple.   Cardiovascular: Normal rate, regular rhythm, normal heart sounds and intact distal pulses.   Pulmonary/Chest: Breath sounds normal.         ED Course   Procedures  Labs Reviewed   CBC W/ AUTO DIFFERENTIAL - Abnormal; Notable for the following components:       Result Value    Mean Corpuscular Hemoglobin 31.8 (*)     Gran% 78.1 (*)     Lymph% 12.3 (*)     All other components within normal limits   COMPREHENSIVE METABOLIC PANEL - Abnormal; Notable for the following components:    Glucose 140 (*)     All other components within normal limits   TROPONIN I   B-TYPE NATRIURETIC PEPTIDE   PROTIME-INR   LACTIC ACID, PLASMA   SARS-COV-2 RNA AMPLIFICATION, QUAL    Narrative:     What symptom criteria does the patient meet?->Shortness of  breath or difficulty breathing   PROCALCITONIN   B-TYPE NATRIURETIC PEPTIDE   CK   LACTATE DEHYDROGENASE   CK   LACTATE DEHYDROGENASE   C-REACTIVE PROTEIN   FERRITIN        ECG Results          EKG 12-lead (In process)  Result time 05/12/20 15:58:21    In process by Interface, Lab In [facility] (05/12/20 15:58:21)                 Narrative:    Test Reason : R06.02,    Vent. Rate : 051 BPM     Atrial Rate : 051 BPM     P-R Int : 178 ms          QRS Dur : 080 ms      QT Int : 478 ms       P-R-T Axes : 035 002 034 degrees      QTc Int : 440 ms    Sinus bradycardia  Otherwise normal ECG  When compared with ECG of 05-JAN-2020 20:22,  No significant change was found    Referred By: AAAREFERR   SELF           Confirmed By:                   In process by Interface, Lab In [facility] (05/12/20 13:53:09)                 Narrative:    Test Reason : R06.02,    Vent. Rate : 051 BPM     Atrial Rate : 051 BPM     P-R Int : 178 ms          QRS Dur : 080 ms      QT Int : 478 ms       P-R-T Axes : 035 002 034 degrees     QTc Int : 440 ms    Sinus bradycardia  Otherwise normal ECG  When compared with ECG of 05-JAN-2020 20:22,  No significant change was found    Referred By: AAAREFERR   SELF           Confirmed By:                             Imaging Results          X-Ray Chest AP Portable (Final result)  Result time 05/12/20 13:18:30    Final result by Chucho Juarez MD (05/12/20 13:18:30)                 Narrative:    HISTORY: Acute dyspnea.    FINDINGS: Portable chest radiograph at 1300 hours compared to multiple  prior exams shows the cardiac silhouette is upper limits of normal in  size, stable, with the pulmonary vasculature stable and within normal  limits. There are aortic vascular calcifications.    The lungs are symmetrically expanded, with mild scattered reticular  and linear opacities in both lungs, with interspersed tiny cystic  lucencies, similar to prior exams and reflecting chronic interstitial  lung disease and pulmonary parenchymal fibrosis. There is no  consolidation, large pleural effusion, evidence of pulmonary edema, or  pneumothorax.    The bones are diffusely osteopenic, with no acute osseous abnormality.    IMPRESSION: Stable scattered chronic interstitial opacities, with no  focal superimposed acute cardiopulmonary process.    Electronically Signed by Chucho BETH on 5/12/2020 1:24 PM                                                               Clinical Impression:       ICD-10-CM ICD-9-CM   1. COPD exacerbation J44.1  491.21   2. SOB (shortness of breath) R06.02 786.05             ED Disposition Condition    Admit                           Valente Ford MD  05/12/20 [id number]

## 2020-05-12 NOTE — RESPIRATORY THERAPY
Results for WILBER DUQUE (MRN [id number]) as of 5/12/2020 18:20   Ref. Range 5/12/2020 18:20   POC PH Latest Ref Range: 7.35 - 7.45  7.440   POC PCO2 Latest Ref Range: 35 - 45 mmHg 40.2   POC PO2 Latest Ref Range: 80 - 100 mmHg 41 (LL)   POC BE Latest Ref Range: -2 to 2 mmol/L 3   POC HCO3 Latest Ref Range: 24 - 28 mmol/L 27.3   POC SATURATED O2 Latest Ref Range: 95 - 100 % 79 (L)   POC TCO2 Latest Ref Range: 23 - 27 mmol/L 29 (H)   Sample Unknown ARTERIAL   DelSys Unknown Room Air   Allens Test Unknown Pass   Site Unknown LR   RESULTS REPORTED AS VENOUS

## 2020-05-12 NOTE — H&P
[facility] History & Physical Examination   Patient Name: Jessica Naidu  MRN: [id number]  Patient Class: IP- Inpatient   Admission Date: 5/12/2020 12:43 PM  Length of Stay: 0  Attending Physician: Delaney Mayes MD  Primary Care Provider: Marcia Calzada MD  Face-to-Face encounter date: 05/12/2020  Code Status: DNR/DNI  Chief Complaint: Shortness of Breath and NOT FEELING GOOD (ONSET SAT)      Covid test negative       Patient information was obtained from patient, past medical records and ER records and ED physician sign out.   HISTORY OF PRESENT ILLNESS:   Jesisca Naidu is a 85 y.o.  female who  has a past medical history of ILD , bronchiectasis Arthritis, Hearing loss, conductive, bilateral, High cholesterol, History of shingles, Hypertension, Kidney artery constriction, Lumbar radiculopathy, Osteoporosis, PONV (postoperative nausea and vomiting), PVD (peripheral vascular disease), CHASTITY (renal artery stenosis) (11/26/2018), . The patient presented to [facility] on 5/12/2020 with a primary complaint of Shortness of Breath and NOT FEELING GOOD (ONSET SAT)  Pt appeared confused, but calm hx obtained from daughter at bedside.  Pt feels not good since last Saturday, feels weak , chills , denies fever or contact hx with COVID, had worsened SOB with productive cough ,yellow sputum , not sure abt hemoptysis, vomiting, appeared more forgetful than baseline , c/o headache.    Pt does not use oxy at home, remote hx of smoking more than 40-60 yrs, awaiting for ILD tx  In ED covid test neg.      REVIEW OF SYSTEMS:   10 Point Review of System was performed and was found to be negative except for that mentioned already in the HPI above.     PAST MEDICAL HISTORY:     Past Medical History:   Diagnosis Date    Arthritis     Hearing loss, conductive, bilateral     High cholesterol     History of shingles     vaginal    Hypertension     Kidney artery constriction      has renal artery stent    Lumbar radiculopathy     Osteoporosis     PONV (postoperative nausea and vomiting)     PVD (peripheral vascular disease)     CHASTITY (renal artery stenosis) 11/26/2018    Stroke     of eye vasculature       PAST SURGICAL HISTORY:     Past Surgical History:   Procedure Laterality Date    ANTERIOR CERVICAL DISCECTOMY W/ FUSION      APPENDECTOMY      BLADDER SUSPENSION      CERVICAL SPINE SURGERY      COLONOSCOPY      COSMETIC SURGERY      EYE SURGERY      HYSTERECTOMY         ALLERGIES:   Adhesive; Arb-angiotensin receptor antagonist; Atacand [candesartan]; Bactrim [sulfamethoxazole-trimethoprim]; Codeine; Penicillins; and Trental [pentoxifylline]    FAMILY HISTORY:     Family History   Problem Relation Age of Onset    Arthritis Mother     Hearing loss Mother     Heart disease Mother     Hypertension Mother     Kidney disease Mother     Stroke Mother     Heart disease Father        SOCIAL HISTORY:     Social History     Tobacco Use    Smoking status: Former Smoker     Packs/day: 1.00     Types: Cigarettes    Smokeless tobacco: Never Used    Tobacco comment: quit 30 yrs ago   Substance Use Topics    Alcohol use: No        Social History     Substance and Sexual Activity   Sexual Activity Not on file        HOME MEDICATIONS:     Prior to Admission medications    Medication Sig Start Date End Date Taking? Authorizing Provider   brimonidine 0.1% (ALPHAGAN P) 0.1 % Drop Place 1 drop into both eyes 3 (three) times daily. 8/13/19  Yes Marcia Calzada MD   calcitRIOL (ROCALTROL) 0.25 MCG Cap TAKE 1 CAPSULE EVERY DAY  Patient taking differently: Take 0.25 mcg by mouth once daily.  10/29/19  Yes Marcia Calzada MD   cholecalciferol, vitamin D3, 50,000 unit capsule Take 1 capsule (50,000 Units total) by mouth every 7 days. 11/20/19  Yes Marcia Calzada MD   cycloSPORINE (RESTASIS) 0.05 % ophthalmic emulsion Place 1 drop into both eyes 2 (two) times daily.   Yes Historical  Provider, MD   diltiaZEM (CARDIZEM CD) 360 MG 24 hr capsule Take 1 capsule (360 mg total) by mouth once daily. 11/20/19  Yes Marcia Calzada MD   donepezil (ARICEPT) 10 MG tablet Take 1 tablet (10 mg total) by mouth once daily. 11/20/19  Yes Marcia Calzada MD   memantine (NAMENDA) 10 MG Tab Take 1 tablet (10 mg total) by mouth 2 (two) times daily. 11/20/19  Yes Marcia Calzada MD   alendronate (FOSAMAX) 70 MG tablet Take 1 tablet (70 mg total) by mouth every 7 days. 11/20/19   Marcia Calzada MD   trimethoprim (TRIMPEX) 100 mg Tab Take 1 tablet (100 mg total) by mouth every evening. 7/31/19   M. IBETH Mchugh   aspirin 81 MG Chew Take 1 tablet (81 mg total) by mouth once daily. 11/20/19 5/12/20  Marcia Calzada MD   atorvastatin (LIPITOR) 10 MG tablet Take 1 tablet (10 mg total) by mouth once daily.  Patient taking differently: Take 10 mg by mouth every evening.  11/20/19 5/12/20  Marcia Calzada MD   camphor-menthol 0.5-0.5% (SARNA ORIGINAL) lotion Apply topically as needed for Itching. 1/9/20 5/12/20  Terri Schulte MD   cilostazol (PLETAL) 50 MG Tab Take 2 tablets (100 mg total) by mouth 2 (two) times daily. 11/20/19 5/12/20  Marcia Calzada MD   HYDROcodone-acetaminophen (NORCO) 7.5-325 mg per tablet  11/21/19 5/12/20  Historical Provider, MD   hydrOXYzine HCl (ATARAX) 25 MG tablet Take 25 mg by mouth 3 (three) times daily as needed for Itching.  5/12/20  Historical Provider, MD   ipratropium (ATROVENT) 42 mcg (0.06 %) nasal spray 1 spray by Nasal route 3 (three) times daily as needed.  12/17/19 5/12/20  Historical Provider, MD   levocetirizine (XYZAL) 5 MG tablet Take 1 tablet (5 mg total) by mouth 2 (two) times daily. 1/9/20 5/12/20  Terri Schulte MD   omeprazole (PRILOSEC) 40 MG capsule Take 1 capsule (40 mg total) by mouth once daily. 11/20/19 5/12/20  Marcia Calzada MD   predniSONE (DELTASONE) 50 MG Tab Take 1 tablet (50 mg total) by mouth once daily. 1/4/20 5/12/20  Chavez Baker MD  "  ranitidine (ZANTAC) 150 MG tablet Take 1 tablet (150 mg total) by mouth 2 (two) times daily. 1/9/20 5/12/20  Terri Schulte MD   triamcinolone acetonide 0.1% (KENALOG) 0.1 % ointment Apply topically 2 (two) times daily. 1/9/20 5/12/20  Terri Schulte MD         PHYSICAL EXAM:   BP (!) 150/65 (BP Location: Left arm, Patient Position: Sitting)   Pulse 66   Temp 98.1 °F (36.7 °C) (Axillary)   Resp (!) 31   Ht 4' 10" (1.473 m)   Wt 54.4 kg (120 lb)   SpO2 97%   BMI 25.08 kg/m²   Vitals Reviewed  General appearance: Well-developed, well-nourished female in no apparent distress.  Appears pleasant hard-of-hearing  Skin: No Rash.   Neuro: Motor and sensory exams grossly intact. Good tone. Power in all 4 extremities 5/5.   HENT: Atraumatic head. Moist mucous membranes of oral cavity.  Eyes: Normal extraocular movements.   Neck: Supple. No evidence of lymphadenopathy. No thyroidomegaly.  Lungs: inspiratory crackle  bilaterally. No wheezing present. Became dizzy on deep breath  Heart: Regular rate and rhythm. S1 and S2 present with no murmurs/gallop/rub. No pedal edema. No JVD present.   Abdomen: Soft, non-distended, non-tender. No rebound tenderness/guarding. No masses or organomegaly. Bowel sounds are normal. Bladder is not palpable.  :no marshall ,no CVA tenderness  Extremities: No cyanosis, clubbing, or edema.  Psych/mental status: Alert and oriented. Cooperative. Responds appropriately to questions.     EMERGENCY DEPARTMENT LABS AND IMAGING:     Labs Reviewed   CBC W/ AUTO DIFFERENTIAL - Abnormal; Notable for the following components:       Result Value    Mean Corpuscular Hemoglobin 31.8 (*)     Gran% 78.1 (*)     Lymph% 12.3 (*)     All other components within normal limits   COMPREHENSIVE METABOLIC PANEL - Abnormal; Notable for the following components:    Glucose 140 (*)     All other components within normal limits   TROPONIN I   B-TYPE NATRIURETIC PEPTIDE   PROTIME-INR   LACTIC ACID, PLASMA "   SARS-COV-2 RNA AMPLIFICATION, QUAL    Narrative:     What symptom criteria does the patient meet?->Shortness of  breath or difficulty breathing   PROCALCITONIN   B-TYPE NATRIURETIC PEPTIDE   CK   LACTATE DEHYDROGENASE   CK   LACTATE DEHYDROGENASE   C-REACTIVE PROTEIN   FERRITIN   MAGNESIUM   PHOSPHORUS   INFLUENZA A AND B ANTIGEN       X-Ray Chest AP Portable   Final Result          ASSESSMENT & PLAN:   Jessica Naidu is a 85 y.o. female admitted for    # Brochitis possibly 2/2 viral etiology  Covid negative  Will obtain influenza, respiratory viral panel , procal negative  C/w duoneb, antitussive, gentle hydration,tesslan pearls  Pt received iv solumedrol in ED, will hold    # ILD appears chronic   Consult Pulmonology     # Dementia  Slowly worsening    # osteoporosis  C/w home meds    DVT Prophylaxis: will be placed on Lovenox for DVT prophylaxis and will be advised to be as mobile as possible and sit in a chair as tolerated.     INPATIENT LIST OF MEDICATIONS     Current Facility-Administered Medications:     acetaminophen tablet 650 mg, 650 mg, Oral, Q4H PRN, Delaney Mayes MD    albuterol nebulizer solution 2.5 mg, 2.5 mg, Nebulization, Q4H PRN, Delaney Mayes MD    albuterol-ipratropium 2.5 mg-0.5 mg/3 mL nebulizer solution 3 mL, 3 mL, Nebulization, Q6H, Delaney Mayes MD    [START ON 5/13/2020] alendronate tablet 70 mg, 70 mg, Oral, Q7 Days, Delaney Mayes MD    [START ON 5/13/2020] azithromycin tablet 500 mg, 500 mg, Oral, Daily, Delaney Mayes MD    brimonidine 0.15 % OPTH DROP ophthalmic solution 1 drop, 1 drop, Both Eyes, TID, Delaney Mayes MD    [START ON 5/13/2020] calcitRIOL capsule 0.25 mcg, 0.25 mcg, Oral, Daily, Delaney Mayes MD    [START ON 5/13/2020] diltiaZEM 24 hr capsule 360 mg, 360 mg, Oral, Daily, Delaney Mayes MD    [START ON 5/13/2020] donepeziL tablet 10 mg, 10 mg, Oral, Daily, Delaney Mayes MD    enoxaparin injection 40 mg, 40 mg,  Subcutaneous, Daily, Delaney Mayes MD    melatonin tablet 9 mg, 9 mg, Oral, Nightly PRN, Delaney Mayes MD    memantine tablet 10 mg, 10 mg, Oral, BID, Delaney Mayes MD    ondansetron injection 4 mg, 4 mg, Intravenous, ED 1 Time, Delaney Mayes MD    [START ON 5/13/2020] pantoprazole EC tablet 40 mg, 40 mg, Oral, Daily, Delaney Mayes MD    polyethylene glycol packet 17 g, 17 g, Oral, BID PRN, Delaney Mayes MD    sodium chloride 0.9% flush 10 mL, 10 mL, Intravenous, PRN, Delaney Mayes MD    Current Outpatient Medications:     brimonidine 0.1% (ALPHAGAN P) 0.1 % Drop, Place 1 drop into both eyes 3 (three) times daily., Disp: 15 mL, Rfl: 1    calcitRIOL (ROCALTROL) 0.25 MCG Cap, TAKE 1 CAPSULE EVERY DAY (Patient taking differently: Take 0.25 mcg by mouth once daily. ), Disp: 90 capsule, Rfl: 1    cholecalciferol, vitamin D3, 50,000 unit capsule, Take 1 capsule (50,000 Units total) by mouth every 7 days., Disp: 12 capsule, Rfl: 0    cycloSPORINE (RESTASIS) 0.05 % ophthalmic emulsion, Place 1 drop into both eyes 2 (two) times daily., Disp: , Rfl:     diltiaZEM (CARDIZEM CD) 360 MG 24 hr capsule, Take 1 capsule (360 mg total) by mouth once daily., Disp: 90 capsule, Rfl: 1    donepezil (ARICEPT) 10 MG tablet, Take 1 tablet (10 mg total) by mouth once daily., Disp: 90 tablet, Rfl: 1    memantine (NAMENDA) 10 MG Tab, Take 1 tablet (10 mg total) by mouth 2 (two) times daily., Disp: 180 tablet, Rfl: 1    alendronate (FOSAMAX) 70 MG tablet, Take 1 tablet (70 mg total) by mouth every 7 days., Disp: 12 tablet, Rfl: 1    trimethoprim (TRIMPEX) 100 mg Tab, Take 1 tablet (100 mg total) by mouth every evening., Disp: 42 tablet, Rfl: 6      Scheduled Meds:   albuterol-ipratropium  3 mL Nebulization Q6H    [START ON 5/13/2020] alendronate  70 mg Oral Q7 Days    [START ON 5/13/2020] azithromycin  500 mg Oral Daily    brimonidine 0.15 % OPTH DROP  1 drop Both Eyes TID    [START ON  5/13/2020] calcitRIOL  0.25 mcg Oral Daily    [START ON 5/13/2020] diltiaZEM  360 mg Oral Daily    [START ON 5/13/2020] donepeziL  10 mg Oral Daily    enoxaparin  40 mg Subcutaneous Daily    memantine  10 mg Oral BID    ondansetron  4 mg Intravenous ED 1 Time    [START ON 5/13/2020] pantoprazole  40 mg Oral Daily     Continuous Infusions:  PRN Meds:.acetaminophen, albuterol sulfate, melatonin, polyethylene glycol, sodium chloride 0.9%      Delaney Mayes  [facility] Hospitalist  05/12/2020

## 2020-05-13 VITALS
BODY MASS INDEX: 25.27 KG/M2 | TEMPERATURE: 99 F | OXYGEN SATURATION: 97 % | DIASTOLIC BLOOD PRESSURE: 45 MMHG | HEIGHT: 58 IN | SYSTOLIC BLOOD PRESSURE: 172 MMHG | RESPIRATION RATE: 18 BRPM | HEART RATE: 72 BPM | WEIGHT: 120.38 LBS

## 2020-05-13 LAB
ALBUMIN SERPL BCP-MCNC: 3.5 G/DL (ref 3.5–5.2)
ALP SERPL-CCNC: 57 U/L (ref 55–135)
ALT SERPL W/O P-5'-P-CCNC: 16 U/L (ref 10–44)
ANION GAP SERPL CALC-SCNC: 13 MMOL/L (ref 8–16)
AST SERPL-CCNC: 25 U/L (ref 10–40)
BASOPHILS # BLD AUTO: 0.01 K/UL (ref 0–0.2)
BASOPHILS NFR BLD: 0.1 % (ref 0–1.9)
BILIRUB SERPL-MCNC: 0.6 MG/DL (ref 0.1–1)
BUN SERPL-MCNC: 28 MG/DL (ref 8–23)
CALCIUM SERPL-MCNC: 9.3 MG/DL (ref 8.7–10.5)
CHLORIDE SERPL-SCNC: 100 MMOL/L (ref 95–110)
CO2 SERPL-SCNC: 24 MMOL/L (ref 23–29)
CREAT SERPL-MCNC: 1.1 MG/DL (ref 0.5–1.4)
DIFFERENTIAL METHOD: ABNORMAL
EOSINOPHIL # BLD AUTO: 0 K/UL (ref 0–0.5)
EOSINOPHIL NFR BLD: 0 % (ref 0–8)
ERYTHROCYTE [DISTWIDTH] IN BLOOD BY AUTOMATED COUNT: 12.6 % (ref 11.5–14.5)
EST. GFR  (AFRICAN AMERICAN): 52.9 ML/MIN/1.73 M^2
EST. GFR  (NON AFRICAN AMERICAN): 45.9 ML/MIN/1.73 M^2
GLUCOSE SERPL-MCNC: 130 MG/DL (ref 70–110)
GLUCOSE SERPL-MCNC: 147 MG/DL (ref 70–110)
GLUCOSE SERPL-MCNC: 151 MG/DL (ref 70–110)
HCT VFR BLD AUTO: 35.7 % (ref 37–48.5)
HGB BLD-MCNC: 11.9 G/DL (ref 12–16)
IMM GRANULOCYTES # BLD AUTO: 0.07 K/UL (ref 0–0.04)
IMM GRANULOCYTES NFR BLD AUTO: 0.7 % (ref 0–0.5)
INFLUENZA A, MOLECULAR: NEGATIVE
INFLUENZA B, MOLECULAR: NEGATIVE
LYMPHOCYTES # BLD AUTO: 0.9 K/UL (ref 1–4.8)
LYMPHOCYTES NFR BLD: 8.1 % (ref 18–48)
MAGNESIUM SERPL-MCNC: 2 MG/DL (ref 1.6–2.6)
MCH RBC QN AUTO: 32.2 PG (ref 27–31)
MCHC RBC AUTO-ENTMCNC: 33.3 G/DL (ref 32–36)
MCV RBC AUTO: 97 FL (ref 82–98)
MONOCYTES # BLD AUTO: 0.1 K/UL (ref 0.3–1)
MONOCYTES NFR BLD: 1.2 % (ref 4–15)
NEUTROPHILS # BLD AUTO: 9.4 K/UL (ref 1.8–7.7)
NEUTROPHILS NFR BLD: 89.9 % (ref 38–73)
NRBC BLD-RTO: 0 /100 WBC
PHOSPHATE SERPL-MCNC: 2.9 MG/DL (ref 2.7–4.5)
PLATELET # BLD AUTO: 280 K/UL (ref 150–350)
PMV BLD AUTO: 10 FL (ref 9.2–12.9)
POTASSIUM SERPL-SCNC: 3.8 MMOL/L (ref 3.5–5.1)
PROT SERPL-MCNC: 6.7 G/DL (ref 6–8.4)
RBC # BLD AUTO: 3.69 M/UL (ref 4–5.4)
SODIUM SERPL-SCNC: 137 MMOL/L (ref 136–145)
SPECIMEN SOURCE: NORMAL
WBC # BLD AUTO: 10.47 K/UL (ref 3.9–12.7)

## 2020-05-13 PROCEDURE — 87486 CHLMYD PNEUM DNA AMP PROBE: CPT

## 2020-05-13 PROCEDURE — 25000003 PHARM REV CODE 250: Performed by: INTERNAL MEDICINE

## 2020-05-13 PROCEDURE — 94640 AIRWAY INHALATION TREATMENT: CPT | Mod: 76

## 2020-05-13 PROCEDURE — 87633 RESP VIRUS 12-25 TARGETS: CPT | Mod: 59

## 2020-05-13 PROCEDURE — 85025 COMPLETE CBC W/AUTO DIFF WBC: CPT | Mod: 59

## 2020-05-13 PROCEDURE — 99900035 HC TECH TIME PER 15 MIN (STAT)

## 2020-05-13 PROCEDURE — 84100 ASSAY OF PHOSPHORUS: CPT

## 2020-05-13 PROCEDURE — 25000242 PHARM REV CODE 250 ALT 637 W/ HCPCS: Performed by: INTERNAL MEDICINE

## 2020-05-13 PROCEDURE — 80053 COMPREHEN METABOLIC PANEL: CPT

## 2020-05-13 PROCEDURE — 63700000 PHARM REV CODE 250 ALT 637 W/O HCPCS: Performed by: INTERNAL MEDICINE

## 2020-05-13 PROCEDURE — 87502 INFLUENZA DNA AMP PROBE: CPT | Mod: 59

## 2020-05-13 PROCEDURE — 83735 ASSAY OF MAGNESIUM: CPT | Mod: 59

## 2020-05-13 PROCEDURE — G0378 HOSPITAL OBSERVATION PER HR: HCPCS

## 2020-05-13 PROCEDURE — 94640 AIRWAY INHALATION TREATMENT: CPT

## 2020-05-13 PROCEDURE — 36415 COLL VENOUS BLD VENIPUNCTURE: CPT | Mod: 59

## 2020-05-13 PROCEDURE — 94761 N-INVAS EAR/PLS OXIMETRY MLT: CPT

## 2020-05-13 RX ORDER — BENZONATATE 100 MG/1
100 CAPSULE ORAL 3 TIMES DAILY PRN
Qty: 30 CAPSULE | Refills: 0 | Status: SHIPPED | OUTPATIENT
Start: 2020-05-13 | End: 2020-05-20

## 2020-05-13 RX ORDER — DILTIAZEM HYDROCHLORIDE 180 MG/1
360 CAPSULE, COATED, EXTENDED RELEASE ORAL DAILY
Status: DISCONTINUED | OUTPATIENT
Start: 2020-05-13 | End: 2020-05-13 | Stop reason: HOSPADM

## 2020-05-13 RX ORDER — IPRATROPIUM BROMIDE AND ALBUTEROL SULFATE 2.5; .5 MG/3ML; MG/3ML
SOLUTION RESPIRATORY (INHALATION)
Status: DISCONTINUED
Start: 2020-05-13 | End: 2020-05-13 | Stop reason: HOSPADM

## 2020-05-13 RX ORDER — ALBUTEROL SULFATE 0.83 MG/ML
2.5 SOLUTION RESPIRATORY (INHALATION) EVERY 4 HOURS PRN
Qty: 30 EACH | Refills: 0 | Status: SHIPPED | OUTPATIENT
Start: 2020-05-13 | End: 2021-05-13

## 2020-05-13 RX ADMIN — DILTIAZEM HYDROCHLORIDE 360 MG: 180 CAPSULE, COATED, EXTENDED RELEASE ORAL at 11:05

## 2020-05-13 RX ADMIN — IPRATROPIUM BROMIDE AND ALBUTEROL SULFATE 3 ML: .5; 3 SOLUTION RESPIRATORY (INHALATION) at 12:05

## 2020-05-13 RX ADMIN — IPRATROPIUM BROMIDE AND ALBUTEROL SULFATE 3 ML: .5; 3 SOLUTION RESPIRATORY (INHALATION) at 07:05

## 2020-05-13 RX ADMIN — BRIMONIDINE TARTRATE 1 DROP: 1.5 SOLUTION OPHTHALMIC at 08:05

## 2020-05-13 RX ADMIN — DONEPEZIL HYDROCHLORIDE 10 MG: 5 TABLET, FILM COATED ORAL at 08:05

## 2020-05-13 RX ADMIN — MEMANTINE HYDROCHLORIDE 10 MG: 5 TABLET ORAL at 08:05

## 2020-05-13 RX ADMIN — AZITHROMYCIN MONOHYDRATE 500 MG: 250 TABLET ORAL at 08:05

## 2020-05-13 RX ADMIN — CALCITRIOL CAPSULES 0.25 MCG 0.25 MCG: 0.25 CAPSULE ORAL at 08:05

## 2020-05-13 NOTE — PLAN OF CARE
05/13/20 1406   Discharge Assessment   Assessment Type Discharge Planning Assessment   Confirmed/corrected address and phone number on facesheet? Yes   Assessment information obtained from? Patient   Communicated expected length of stay with patient/caregiver no   Prior to hospitilization cognitive status: Alert/Oriented   Prior to hospitalization functional status: Independent   Current cognitive status: Alert/Oriented   Current Functional Status: Independent   Facility Arrived From: home   Lives With spouse   Able to Return to Prior Arrangements yes   Is patient able to care for self after discharge? Yes   Who are your caregiver(s) and their phone number(s)? Corinne Gambino [phone]   Patient's perception of discharge disposition home or selfcare   Readmission Within the Last 30 Days no previous admission in last 30 days   Patient currently being followed by outpatient case management? No   Patient currently receives any other outside agency services? No   Equipment Currently Used at Home oxygen;walker, rolling   Do you have any problems affording any of your prescribed medications? No   Is the patient taking medications as prescribed? yes   Does the patient have transportation home? Yes   Transportation Anticipated family or friend will provide   Dialysis Name and Scheduled days n/a   Does the patient receive services at the [facility]madin Clinic? No   Discharge Plan A Home   Discharge Plan B Home with family   DME Needed Upon Discharge  none   Patient/Family in Agreement with Plan yes   Introduced self to patient asked if ok to take a few min of their time for short interview for D/C planning and ok with patient

## 2020-05-13 NOTE — PLAN OF CARE
05/13/20 0021   Patient Assessment/Suction   Level of Consciousness (AVPU) alert   Respiratory Effort Normal;Unlabored   Expansion/Accessory Muscles/Retractions no use of accessory muscles   All Lung Fields Breath Sounds   (scattered crackles noted)   PRE-TX-O2   O2 Device (Oxygen Therapy) room air   SpO2 95 %   Pulse Oximetry Type Intermittent   $ Pulse Oximetry - Multiple Charge Pulse Oximetry - Multiple   Pulse (!) 58   Resp (!) 22   Aerosol Therapy   $ Aerosol Therapy Charges Aerosol Treatment   Daily Review of Necessity (SVN) completed   Respiratory Treatment Status (SVN) given   Treatment Route (SVN) mask   Patient Position (SVN) semi-Suarez's   Post Treatment Assessment (SVN) increased aeration   Signs of Intolerance (SVN) none   Breath Sounds Post-Respiratory Treatment   Throughout All Fields Post-Treatment All Fields   Throughout All Fields Post-Treatment aeration increased   Post-treatment Heart Rate (beats/min) 53   Post-treatment Resp Rate (breaths/min) 20   jorgenconrad tx. As ordered

## 2020-05-13 NOTE — PLAN OF CARE
05/13/20 1703   Final Note   Assessment Type Final Discharge Note   Anticipated Discharge Disposition Home-Health   Spoke with patient about Freedom of Choice Form, explained to patient and family that they have the right to choose any agency, and a list of agencies was provided to patient and family to review, they verbalized an understanding, pt signed form, and form scanned into CM notes. Sent referrall for home health to [facility].

## 2020-05-13 NOTE — PLAN OF CARE
05/13/20 0712   Patient Assessment/Suction   Level of Consciousness (AVPU) alert   Respiratory Effort Normal;Unlabored   Expansion/Accessory Muscles/Retractions no use of accessory muscles;no retractions;expansion symmetric   All Lung Fields Breath Sounds clear;diminished   Rhythm/Pattern, Respiratory unlabored;pattern regular;depth regular;chest wiggle adequate;no shortness of breath reported   Cough Frequency infrequent   Cough Type good   PRE-TX-O2   O2 Device (Oxygen Therapy) room air   SpO2 98 %   Pulse Oximetry Type Intermittent   $ Pulse Oximetry - Multiple Charge Pulse Oximetry - Multiple   Pulse 64   Resp 18   Aerosol Therapy   $ Aerosol Therapy Charges Aerosol Treatment   Daily Review of Necessity (SVN) completed   Respiratory Treatment Status (SVN) given   Treatment Route (SVN) mask   Patient Position (SVN) HOB elevated   Post Treatment Assessment (SVN) increased aeration   Signs of Intolerance (SVN) none   Breath Sounds Post-Respiratory Treatment   Throughout All Fields Post-Treatment All Fields   Throughout All Fields Post-Treatment aeration increased   Post-treatment Heart Rate (beats/min) 55   Post-treatment Resp Rate (breaths/min) 18   Respiratory Evaluation   $ Care Plan Tech Time 15 min

## 2020-05-13 NOTE — DISCHARGE SUMMARY
[facility]  Discharge Summary      Patient Name: Jessica Naidu  MRN: [id number]  Admission Date: 5/12/2020  Discharge Date and Time:  05/13/2020 4:15 PM  Attending Physician: Marcos Fair MD   Primary Care Provider: Marcia Calzada MD    HPI:   Jessica Naidu is a 85 y.o.  female who  has a past medical history of ILD, bronchiectasis Arthritis, Hearing loss, conductive, bilateral, High cholesterol, History of shingles, Hypertension, Kidney artery constriction, Lumbar radiculopathy, Osteoporosis, PONV (postoperative nausea and vomiting), PVD (peripheral vascular disease), CHASTITY (renal artery stenosis) (11/26/2018), . The patient presented to [facility] on 5/12/2020 with a primary complaint of Shortness of Breath and NOT FEELING GOOD (ONSET SAT)  Pt appeared confused, but calm hx obtained from daughter at bedside. Pt feels not good since last Saturday, feels weak , chills , denies fever or contact hx with COVID, had worsened SOB with productive cough ,yellow sputum , not sure abt hemoptysis, vomiting, appeared more forgetful than baseline , c/o headache.  Pt does not use oxy at home, remote hx of smoking more than 40-60 yrs, awaiting for ILD tx. In ED covid test neg.     Hospital Course:   The patient was admitted to the hospital for workup and treatment including bronchodilators, antitussives, and mucolytic.  She was given supplemental oxygen which has been weaned. She was given antiemetics and her nausea vomiting has resolved.  She is now tolerating diet.  She is mobilizing independently.  Workup including COVID testing, influenza, and procalcitonin are unremarkable.  The patient likely has mild viral syndrome to which she is recovering well.  I discussed the case today with Pulmonary Dr. Demarco.  The patient is medically stable for discharge home with home health services.  The patient is in understanding and agreement with the discharge plan today.   She will follow-up PCP and Pulmonary as outpatient.    Consults:   Consults (From admission, onward)        Status Ordering Provider     Inpatient consult to Hospitalist  Once     Provider:  Delaney Matute MD    Acknowledged DELANEY MATUTE     Inpatient consult to Pulmonology  Once     Provider:  Cat Demarco MD    Completed DELANEY MATUTE     Inpatient consult to Respiratory Care  Once     Provider:  (Not yet assigned)    Acknowledged DELANEY MATUTE        Discharge diagnoses:  Acute viral syndrome with gastroenteritis and bronchitis  Pulmonary fibrosis  Advanced dementia  Osteoporosis  Additional discharge Diagnoses:    Diagnosis Date Noted POA    PRINCIPAL PROBLEM:  Bronchitis [J40] 05/12/2020 Yes    COPD exacerbation [J44.1] 05/12/2020 Yes     Discharge exam:  Comfortable work of breathing, speaking complete sentences with nasal cannula oxygen  Nontoxic, pleasant, follows commands, fluent speech  2+ radial pulses, moist mucous membranes    Discharged Condition: stable    Disposition: Home-Health Care c    Follow Up:  Follow-up Information     Marcia Calzada MD In 2 weeks.    Specialty:  Internal Medicine  Contact information:  [address] [address]  [phone]             Jeffery Toney MD In 1 week.    Specialty:  Pulmonary Disease  Contact information:  [address]  [id number]  [facility] [facility] [place] [address]  [phone]                 Patient Instructions:      Ambulatory referral/consult to Home Health   Standing Status: Future   Referral Priority: Routine Referral Type: Home Health   Referral Reason: Specialty Services Required   Requested Specialty: Home Health Services   Number of Visits Requested: 1     Diet Cardiac     Notify your health care provider if you experience any of the following:  temperature >100.4     Notify your health care provider if you experience any of the following:  severe uncontrolled pain     Notify your health care provider  if you experience any of the following:  difficulty breathing or increased cough     Notify your health care provider if you experience any of the following:  persistent nausea and vomiting or diarrhea     Activity as tolerated       Pending Diagnostic Studies:     None         Medications:  Reconciled Home Medications:      Medication List      START taking these medications    albuterol 2.5 mg /3 mL (0.083 %) nebulizer solution  Commonly known as:  PROVENTIL  Take 3 mLs (2.5 mg total) by nebulization every 4 (four) hours as needed (shortness of breath). Rescue     benzonatate 100 MG capsule  Commonly known as:  TESSALON  Take 1 capsule (100 mg total) by mouth 3 (three) times daily as needed for Cough.        CONTINUE taking these medications    alendronate 70 MG tablet  Commonly known as:  FOSAMAX  Take 1 tablet (70 mg total) by mouth every 7 days.     brimonidine 0.1% 0.1 % Drop  Commonly known as:  ALPHAGAN P  Place 1 drop into both eyes 3 (three) times daily.     calcitRIOL 0.25 MCG Cap  Commonly known as:  ROCALTROL  TAKE 1 CAPSULE EVERY DAY     cholecalciferol (vitamin D3) 1,250 mcg (50,000 unit) capsule  Take 1 capsule (50,000 Units total) by mouth every 7 days.     cycloSPORINE 0.05 % ophthalmic emulsion  Commonly known as:  RESTASIS  Place 1 drop into both eyes 2 (two) times daily.     diltiaZEM 360 MG 24 hr capsule  Commonly known as:  CARDIZEM CD  Take 1 capsule (360 mg total) by mouth once daily.     donepeziL 10 MG tablet  Commonly known as:  ARICEPT  Take 1 tablet (10 mg total) by mouth once daily.     ibuprofen 800 MG tablet  Commonly known as:  ADVIL,MOTRIN  Take 800 mg by mouth daily as needed for Pain.     memantine 10 MG Tab  Commonly known as:  NAMENDA  Take 1 tablet (10 mg total) by mouth 2 (two) times daily.     trimethoprim 100 mg Tab  Commonly known as:  TRIMPEX  Take 1 tablet (100 mg total) by mouth every evening.            Indwelling Lines/Drains at time of discharge:    Lines/Drains/Airways     None                 Time spent on the discharge of patient: 27 minutes  Patient was seen and examined on the date of discharge and determined to be suitable for discharge.         Marcos Fair MD  Department of Hospital Medicine  [facility]

## 2020-05-13 NOTE — PLAN OF CARE
05/13/20 1207   LAGUNA Message   Medicare Outpatient and Observation Notification regarding financial responsibility Given to patient/caregiver;Explained to patient/caregiver;Signed/date by patient/caregiver   Date LAGUNA was signed 05/13/20   Time LAGUNA was signed 1040     Introduced self and asked pt if ok to take few min to discuss Medicare form, and ok with pt. Explained that pt in observation status and Medicare wants to inform them of the MOON form, pt verbalized an understanding to form, form signed by pt and form scanned into CM notes. Copy made of signed form for pt and copy delivered to pt.

## 2020-05-13 NOTE — CONSULTS
Pulmonary/Critical Care Consult      Patient name: Jessica Naidu  MRN: [id number]  Date: 05/12/2020    Admit Date: 5/12/2020  Consult Requested By: Delaney Mayes MD    Reason for Consult:  Shortness of breath    HPI:    The patient is an 85-year-old female with pulmonary fibrosis.  On Saturday she began having nausea and vomiting.  By today she was weak and tremulous in feeling more short of breath and her daughter brought her to the hospital.  The patient has a history of chronically clearing her throat and has edema in her throat secondary to the constant repetitious clearing.  Dr. Beltran is her ENT.  Dr. Hackett has diagnosed her with pulmonary fibrosis and was reportedly trying to get her started on Ofev.  The daughter states they have not heard anything in the last 6 months and have not followed up on this.  The patient is very hard of hearing and has significantly advanced dementia.  She is a DNR DNI.    ROS  General: Feeling poorly  Eyes: Vision is not good.  ENT:  Chronic pharyngitis from throat clearing.  Very hard of hearing.    Heart:: No chest pain or palpitations.  Lungs:  The patient has a dry cough.  She is dyspneic when she clears her throat a great dealing gets herself coughing.  GI:  The patient has been having nausea and vomiting.  :  The patient has repeated urinary tract infections.  Skin: No lesions or rashes.  Musculoskeletal: No joint pain or myalgias.  Neuro: No headaches or neuropathy.  Lymph: No edema or adenopathy.  Psych:  The daughter reports she is extremely demented.  Endo: No weight change.    Past Medical History    Past Medical History:   Diagnosis Date    Arthritis     Hearing loss, conductive, bilateral     High cholesterol     History of shingles     vaginal    Hypertension     Kidney artery constriction     has renal artery stent    Lumbar radiculopathy     Osteoporosis     PONV (postoperative nausea and vomiting)     PVD (peripheral vascular disease)     CHASTITY  (renal artery stenosis) 11/26/2018    Stroke     of eye vasculature     Frequent urinary tract infections  Chronic kidney disease, unknown stage, followed by Dr. Gaspar  Pulmonary fibrosis  Chronic pharyngitis  Past Surgical History    Past Surgical History:   Procedure Laterality Date    ANTERIOR CERVICAL DISCECTOMY W/ FUSION      APPENDECTOMY      BLADDER SUSPENSION      CERVICAL SPINE SURGERY      COLONOSCOPY      COSMETIC SURGERY      EYE SURGERY      HYSTERECTOMY         Medications (scheduled):      albuterol-ipratropium  3 mL Nebulization Q6H    [START ON 5/13/2020] alendronate  70 mg Oral Q7 Days    [START ON 5/13/2020] azithromycin  500 mg Oral Daily    brimonidine 0.15 % OPTH DROP  1 drop Both Eyes TID    [START ON 5/13/2020] calcitRIOL  0.25 mcg Oral Daily    [START ON 5/13/2020] diltiaZEM  360 mg Oral Daily    [START ON 5/13/2020] donepeziL  10 mg Oral Daily    enoxaparin  40 mg Subcutaneous Daily    memantine  10 mg Oral BID    ondansetron  4 mg Intravenous ED 1 Time    [START ON 5/13/2020] pantoprazole  40 mg Oral Daily           Medications (prn):     acetaminophen, albuterol sulfate, melatonin, polyethylene glycol, sodium chloride 0.9%  Review of patient's allergies indicates:   Allergen Reactions    Adhesive     Arb-angiotensin receptor antagonist     Atacand [candesartan]     Bactrim [sulfamethoxazole-trimethoprim]     Codeine Hives    Penicillins Hives    Trental [pentoxifylline]          Social History:  The patient lives with her daughter.            Tobacco:   Social History     Tobacco Use   Smoking Status Former Smoker    Packs/day: 1.00    Types: Cigarettes   Smokeless Tobacco Never Used   Tobacco Comment    quit 30 yrs ago                                EtOH:   Social History     Substance and Sexual Activity   Alcohol Use No                                Drugs:   Social History     Substance and Sexual Activity   Drug Use No         Family History:   Family  "History   Problem Relation Age of Onset    Arthritis Mother     Hearing loss Mother     Heart disease Mother     Hypertension Mother     Kidney disease Mother     Stroke Mother     Heart disease Father        Physical Exam    Vital signs:  Temp:  [97.9 °F (36.6 °C)-98.4 °F (36.9 °C)]   Pulse:  [48-69]   Resp:  [18-42]   BP: (112-191)/(53-84)   SpO2:  [92 %-97 %]     Intake/Output: No intake or output data in the 24 hours ending 05/1935     BMI: Estimated body mass index is 25.08 kg/m² as calculated from the following:    Height as of this encounter: 4' 10" (1.473 m).    Weight as of this encounter: 54.4 kg (120 lb).    Physical Exam  GENERAL: Older patient in no distress.  HEENT: Pupils equal and reactive. Extraocular movements intact. Nose intact. Pharynx erythematous.  NECK: Supple.   HEART: Regular rate and rhythm. No murmur or gallop auscultated.  LUNGS:  There are dense crackles throughout the posterior lung fields extending above the scapula.  These crackles extend laterally to the middle lobe and lingula.  Lung excursion symmetrical. No change in fremitus. No adventitial noises.  ABDOMEN: Bowel sounds present. Non-tender, no masses palpated.  : Normal anatomy.  EXTREMITIES: Normal muscle tone and joint movement, no cyanosis or clubbing.   LYMPHATICS: No adenopathy palpated, no edema.  SKIN: Dry, intact, no lesions.   NEURO: Cranial nerves II-XII intact. Motor strength 5/5 bilaterally, upper and lower extremities.  Cranial nerve 8 is not good.  PSYCH: Appropriate affect.    Laboratory    Recent Labs   Lab 05/12/20  1302   WBC 8.05   RBC 4.02   HGB 12.8   HCT 38.9      MCV 97   MCH 31.8*   MCHC 32.9       Recent Labs   Lab 05/12/20  1302   CALCIUM 9.2   PROT 6.9      K 3.7   CO2 28      BUN 19   CREATININE 0.8   ALKPHOS 61   ALT 16   AST 25   BILITOT 0.8       Recent Labs   Lab 05/12/20  1302   INR 1.1       Recent Labs   Lab 05/12/20  1302   CPK 43   TROPONINI <0.030 "     Recent Labs   Lab 05/12/20  1302   BNP 48     ABG  Recent Labs   Lab 05/12/20  1840   PH 7.487*   PO2 102*   PCO2 35.1   HCO3 26.6   BE 3       Microbiology:       Microbiology Results (last 7 days)     Procedure Component Value Units Date/Time    Respiratory Viral Panel by PCR [place]; Nasopharyngeal Swab [[id number]]     Order Status:  No result Specimen:  Respiratory           Radiology  CT 05/12/2020 shows subpleural honeycombing with bronchiectasis in the lower lobes    Chest x-ray today shows no acute changes            Recent Labs   Lab 05/12/20  1840   PH 7.487*   PCO2 35.1   PO2 102*   HCO3 26.6   POCSATURATED 98   BE 3     On room air    Impression/Plan  Advanced dementia  Pulmonary fibrosis, stable  Viral illness, COVID negative    Given the patient's advanced dementia I would not advocate for over 7 this patient  Pulmonary status is stable  Please call if I can be of assistance

## 2020-05-13 NOTE — NURSING
Notified Dr Fair that pt pulled her IV out and continues to pull her tele box off. Dr Fair said to leave IV out for now.

## 2020-05-13 NOTE — PROGRESS NOTES
Pulmonary/Critical Care Progress Note      Patient name: Jessica Naidu  MRN: [id number]  Date: 05/13/2020    Admit Date: 5/12/2020       HPI:    The patient is an 85-year-old female with pulmonary fibrosis.  On Saturday she began having nausea and vomiting.  By today she was weak and tremulous in feeling more short of breath and her daughter brought her to the hospital.  The patient has a history of chronically clearing her throat and has edema in her throat secondary to the constant repetitious clearing.  Dr. Beltran is her ENT.  Dr. Hackett has diagnosed her with pulmonary fibrosis and was reportedly trying to get her started on Ofev.  The daughter states they have not heard anything in the last 6 months and have not followed up on this.  The patient is very hard of hearing and has significantly advanced dementia.  She is a DNR DNI.    Patient is had no further nausea or vomiting.  Her sats remained good on room air.  Her daughter would like her to have something for her mood swings.  I suggested she discuss this with Dr. Fair.    ROS  General: Feeling fine  Eyes: Vision is not good.  ENT:  Chronic pharyngitis from throat clearing.  Very hard of hearing.    Heart:: No chest pain or palpitations.  Lungs:  The patient has a dry cough.  She is dyspneic when she clears her throat a great dealing gets herself coughing.  GI:  No nausea vomiting constipation or diarrhea  :  No dysuria  Skin: No lesions or rashes.  Musculoskeletal: No joint pain or myalgias.  Neuro: No headaches or neuropathy.  Lymph: No edema or adenopathy.  Psych:  The daughter reports she is extremely demented.  Endo: No weight change.    Physical Exam    Vital signs:  Temp:  [97.8 °F (36.6 °C)-98.4 °F (36.9 °C)]   Pulse:  [48-69]   Resp:  [17-42]   BP: (112-191)/(51-84)   SpO2:  [92 %-98 %]     Intake/Output:     Intake/Output Summary (Last 24 hours) at 5/13/2020 1023  Last data filed at 5/13/2020 0600  Gross per 24 hour   Intake 487.5 ml   Output  "--   Net 487.5 ml        BMI: Estimated body mass index is 25.16 kg/m² as calculated from the following:    Height as of this encounter: 4' 10" (1.473 m).    Weight as of this encounter: 54.6 kg (120 lb 5.9 oz).    Physical Exam  GENERAL: Older patient in no distress.  HEENT: Pupils equal and reactive. Extraocular movements intact. Nose intact. Pharynx erythematous.  NECK: Supple.   HEART: Regular rate and rhythm. No murmur or gallop auscultated.  LUNGS:  There are dense crackles throughout the posterior lung fields extending above the scapula.  These crackles extend laterally to the middle lobe and lingula.  Lung excursion symmetrical. No change in fremitus. No adventitial noises.  ABDOMEN: Bowel sounds present. Non-tender, no masses palpated.  : Normal anatomy.  EXTREMITIES: Normal muscle tone and joint movement, no cyanosis or clubbing.   LYMPHATICS: No adenopathy palpated, no edema.  SKIN: Dry, intact, no lesions.   NEURO: Cranial nerves II-XII intact. Motor strength 5/5 bilaterally, upper and lower extremities.  Cranial nerve 8 is not good.  PSYCH: Appropriate affect.    Laboratory    Recent Labs   Lab 05/13/20  0430   WBC 10.47   RBC 3.69*   HGB 11.9*   HCT 35.7*      MCV 97   MCH 32.2*   MCHC 33.3       Recent Labs   Lab 05/13/20  0430   CALCIUM 9.3   PROT 6.7      K 3.8   CO2 24      BUN 28*   CREATININE 1.1   ALKPHOS 57   ALT 16   AST 25   BILITOT 0.6       Recent Labs   Lab 05/12/20  1302   INR 1.1       Recent Labs   Lab 05/12/20  1302   CPK 43   TROPONINI <0.030     Recent Labs   Lab 05/12/20  1302   BNP 48     ABG  Recent Labs   Lab 05/12/20  1840   PH 7.487*   PO2 102*   PCO2 35.1   HCO3 26.6   BE 3       Microbiology:       Microbiology Results (last 7 days)     Procedure Component Value Units Date/Time    Respiratory Viral Panel by PCR [place]; Nasopharyngeal Swab [[id number]] Collected:  05/13/20 0001    Order Status:  Sent Specimen:  Respiratory Updated:  05/13/20 0016    "       Radiology  CT 05/12/2020 shows subpleural honeycombing with bronchiectasis in the lower lobes    Chest x-ray today shows no acute changes       Impression/Plan  Advanced dementia  Idiopathic pulmonary fibrosis, stable  Viral illness, COVID negative, improved    No objection to discharge

## 2020-05-14 LAB
B PERT.PT PRMT NPH QL NAA+NON-PROBE: NOT DETECTED
C PNEUM DNA NPH QL NAA+NON-PROBE: NOT DETECTED
FLUAV H1 2009 PAN RNA NPH NAA+NON-PROBE: NOT DETECTED
FLUAV H1 RNA NPH QL NAA+NON-PROBE: NOT DETECTED
FLUAV H3 RNA NPH QL NAA+NON-PROBE: NOT DETECTED
FLUAV RNA NPH QL NAA+NON-PROBE: NOT DETECTED
FLUBV RNA NPH QL NAA+NON-PROBE: NOT DETECTED
HADV DNA NPH QL NAA+NON-PROBE: NOT DETECTED
HCOV 229E RNA NPH QL NAA+NON-PROBE: NOT DETECTED
HCOV HKU1 RNA NPH QL NAA+NON-PROBE: NOT DETECTED
HCOV NL63 RNA NPH QL NAA+NON-PROBE: NOT DETECTED
HCOV OC43 RNA NPH QL NAA+NON-PROBE: NOT DETECTED
HMPV RNA NPH QL NAA+NON-PROBE: NOT DETECTED
HPIV1 RNA NPH QL NAA+NON-PROBE: NOT DETECTED
HPIV2 RNA NPH QL NAA+NON-PROBE: NOT DETECTED
HPIV3 RNA NPH QL NAA+NON-PROBE: NOT DETECTED
HPIV4 RNA NPH QL NAA+NON-PROBE: NOT DETECTED
M PNEUMO DNA NPH QL NAA+NON-PROBE: NOT DETECTED
RSV RNA NPH QL NAA+NON-PROBE: NOT DETECTED
RV+EV RNA NPH QL NAA+NON-PROBE: NOT DETECTED

## 2020-05-14 RX ORDER — MORPHINE SULFATE 15 MG/1
1 TABLET, FILM COATED, EXTENDED RELEASE ORAL 2 TIMES DAILY
COMMUNITY
Start: 2020-02-06 | End: 2020-05-20

## 2020-05-18 ENCOUNTER — LAB VISIT (OUTPATIENT)
Dept: LAB | Facility: HOSPITAL | Age: 85
End: 2020-05-18
Attending: INTERNAL MEDICINE
Payer: MEDICARE

## 2020-05-18 DIAGNOSIS — E78.00 PURE HYPERCHOLESTEROLEMIA: ICD-10-CM

## 2020-05-18 DIAGNOSIS — E55.9 VITAMIN D DEFICIENCY: ICD-10-CM

## 2020-05-18 DIAGNOSIS — I10 ESSENTIAL HYPERTENSION: ICD-10-CM

## 2020-05-18 LAB
25(OH)D3+25(OH)D2 SERPL-MCNC: 68 NG/ML (ref 30–96)
ALBUMIN SERPL BCP-MCNC: 3.9 G/DL (ref 3.5–5.2)
ALP SERPL-CCNC: 58 U/L (ref 55–135)
ALT SERPL W/O P-5'-P-CCNC: 18 U/L (ref 10–44)
ANION GAP SERPL CALC-SCNC: 9 MMOL/L (ref 8–16)
AST SERPL-CCNC: 26 U/L (ref 10–40)
BASOPHILS # BLD AUTO: 0.05 K/UL (ref 0–0.2)
BASOPHILS NFR BLD: 0.6 % (ref 0–1.9)
BILIRUB SERPL-MCNC: 0.8 MG/DL (ref 0.1–1)
BUN SERPL-MCNC: 14 MG/DL (ref 8–23)
CALCIUM SERPL-MCNC: 9.5 MG/DL (ref 8.7–10.5)
CALCIUM SERPL-MCNC: 9.5 MG/DL (ref 8.7–10.5)
CHLORIDE SERPL-SCNC: 100 MMOL/L (ref 95–110)
CHOLEST SERPL-MCNC: 226 MG/DL (ref 120–199)
CHOLEST/HDLC SERPL: 3.1 {RATIO} (ref 2–5)
CO2 SERPL-SCNC: 26 MMOL/L (ref 23–29)
CREAT SERPL-MCNC: 1 MG/DL (ref 0.5–1.4)
DIFFERENTIAL METHOD: ABNORMAL
EOSINOPHIL # BLD AUTO: 0.4 K/UL (ref 0–0.5)
EOSINOPHIL NFR BLD: 4.8 % (ref 0–8)
ERYTHROCYTE [DISTWIDTH] IN BLOOD BY AUTOMATED COUNT: 12.9 % (ref 11.5–14.5)
EST. GFR  (AFRICAN AMERICAN): 59.4 ML/MIN/1.73 M^2
EST. GFR  (NON AFRICAN AMERICAN): 51.5 ML/MIN/1.73 M^2
GLUCOSE SERPL-MCNC: 98 MG/DL (ref 70–110)
HCT VFR BLD AUTO: 41.3 % (ref 37–48.5)
HDLC SERPL-MCNC: 72 MG/DL (ref 40–75)
HDLC SERPL: 31.9 % (ref 20–50)
HGB BLD-MCNC: 13.3 G/DL (ref 12–16)
IMM GRANULOCYTES # BLD AUTO: 0.12 K/UL (ref 0–0.04)
IMM GRANULOCYTES NFR BLD AUTO: 1.3 % (ref 0–0.5)
LDLC SERPL CALC-MCNC: 125.8 MG/DL (ref 63–159)
LYMPHOCYTES # BLD AUTO: 1.7 K/UL (ref 1–4.8)
LYMPHOCYTES NFR BLD: 19.1 % (ref 18–48)
MCH RBC QN AUTO: 30.9 PG (ref 27–31)
MCHC RBC AUTO-ENTMCNC: 32.2 G/DL (ref 32–36)
MCV RBC AUTO: 96 FL (ref 82–98)
MONOCYTES # BLD AUTO: 0.8 K/UL (ref 0.3–1)
MONOCYTES NFR BLD: 8.8 % (ref 4–15)
NEUTROPHILS # BLD AUTO: 5.9 K/UL (ref 1.8–7.7)
NEUTROPHILS NFR BLD: 65.4 % (ref 38–73)
NONHDLC SERPL-MCNC: 154 MG/DL
NRBC BLD-RTO: 0 /100 WBC
PLATELET # BLD AUTO: 333 K/UL (ref 150–350)
PMV BLD AUTO: 9.7 FL (ref 9.2–12.9)
POTASSIUM SERPL-SCNC: 4 MMOL/L (ref 3.5–5.1)
PROT SERPL-MCNC: 7.2 G/DL (ref 6–8.4)
RBC # BLD AUTO: 4.31 M/UL (ref 4–5.4)
SODIUM SERPL-SCNC: 135 MMOL/L (ref 136–145)
TRIGL SERPL-MCNC: 141 MG/DL (ref 30–150)
WBC # BLD AUTO: 8.97 K/UL (ref 3.9–12.7)

## 2020-05-18 PROCEDURE — 80061 LIPID PANEL: CPT

## 2020-05-18 PROCEDURE — 85025 COMPLETE CBC W/AUTO DIFF WBC: CPT

## 2020-05-18 PROCEDURE — 36415 COLL VENOUS BLD VENIPUNCTURE: CPT

## 2020-05-18 PROCEDURE — 82306 VITAMIN D 25 HYDROXY: CPT

## 2020-05-18 PROCEDURE — 80053 COMPREHEN METABOLIC PANEL: CPT

## 2020-05-19 NOTE — PROGRESS NOTES
SUBJECTIVE:    Patient ID: Jessica Naidu is a 85 y.o. female.    Chief Complaint: Results (labs) and Follow-up    HPI --hospital follow-up    HPI:   Jessica Naidu is a 85 y.o.  female who  has a past medical history of ILD, bronchiectasis Arthritis, Hearing loss, conductive, bilateral, High cholesterol, History of shingles, Hypertension, Kidney artery constriction, Lumbar radiculopathy, Osteoporosis, PONV (postoperative nausea and vomiting), PVD (peripheral vascular disease), CHASTITY (renal artery stenosis) (11/26/2018), . The patient presented to [facility] on 5/12/2020 with a primary complaint of Shortness of Breath and NOT FEELING GOOD  Pt appeared confused, but calm hx obtained from daughter at bedside. Pt feels not good since last Saturday, feels weak , chills , denies fever or contact hx with COVID, had worsened SOB with productive cough ,yellow sputum , not sure abt hemoptysis, vomiting, appeared more forgetful than baseline , c/o headache.  Pt does not use oxy at home, remote hx of smoking more than 40-60 yrs, awaiting for ILD tx. In ED covid test neg.     Hospital Course:   The patient was admitted to the hospital for workup and treatment including bronchodilators, antitussives, and mucolytic.  She was given supplemental oxygen which has been weaned. She was given antiemetics and her nausea vomiting has resolved.  She is now tolerating diet.  She is mobilizing independently.  Workup including COVID testing, influenza, and procalcitonin are unremarkable.  The patient likely has mild viral syndrome to which she is recovering well.  I discussed the case today with Pulmonary Dr. Demarco.  The patient was medically stable for discharge home with home health services.  The patient was in understanding and agreement with the discharge plan today.  She was to follow-up PCP and Pulmonary as outpatient.         Admit Date: 5/12/20   Discharge Date: 5/13/20  Discharge Facility: Hospital     Medication Reconciliation:  Medications changed/added/deleted. See below  New Prescriptions filled after discharge: yes  Discharge summary reviewed:  yes  Pending test results at discharge reviewed:   no  Follow up appointments scheduled:  PCP-PULMONARY  Follow up labs/tests ordered:   no  Home Health ordered on discharge:   yes  Home Health company name: [facility]  DME ordered at discharge:   no  How patient is feeling since discharge from the hospital?     Pt is having headaches medial top head and daughter says left lateral leg is tender ? Cause-she says rafi's memory is extremely poor-short term memory is very bad and she has not recognized her daughter at times-she continues with hacking cough, productive at times-    Medications:  Reconciled Home Medications:       Medication List       START taking these medications    albuterol 2.5 mg /3 mL (0.083 %) nebulizer solution not using  Commonly known as:  PROVENTIL  Take 3 mLs (2.5 mg total) by nebulization every 4 (four) hours as needed (shortness of breath). Rescue      benzonatate 100 MG capsule  Commonly known as:  TESSALON  Take 1 capsule (100 mg total) by mouth 3 (three) times daily as needed for Cough.          CONTINUE taking these medications    alendronate 70 MG tablet  Commonly known as:  FOSAMAX  Take 1 tablet (70 mg total) by mouth every 7 days.      brimonidine 0.1% 0.1 % Drop  Commonly known as:  ALPHAGAN P  Place 1 drop into both eyes 3 (three) times daily.      calcitRIOL 0.25 MCG Cap  Commonly known as:  ROCALTROL  TAKE 1 CAPSULE EVERY DAY      cholecalciferol (vitamin D3) 1,250 mcg (50,000 unit) capsule  Take 1 capsule (50,000 Units total) by mouth every 7 days.      cycloSPORINE 0.05 % ophthalmic emulsion  Commonly known as:  RESTASIS  Place 1 drop into both eyes 2 (two) times daily.      diltiaZEM 360 MG 24 hr capsule  Commonly known as:  CARDIZEM CD  Take 1 capsule (360 mg total) by mouth once daily.      donepeziL 10 MG  tablet  Commonly known as:  ARICEPT  Take 1 tablet (10 mg total) by mouth once daily.      ibuprofen 800 MG tablet  Commonly known as:  ADVIL,MOTRIN  Take 800 mg by mouth daily as needed for Pain.      memantine 10 MG Tab  Commonly known as:  NAMENDA  Take 1 tablet (10 mg total) by mouth 2 (two) times daily.      trimethoprim 100 mg Tab  Commonly known as:  TRIMPEX  Take 1 tablet (100 mg total) by mouth every evening.          Lab Visit on 05/18/2020   Component Date Value Ref Range Status    Vit D, 25-Hydroxy 05/18/2020 68  30 - 96 ng/mL Final    WBC 05/18/2020 8.97  3.90 - 12.70 K/uL Final    RBC 05/18/2020 4.31  4.00 - 5.40 M/uL Final    Hemoglobin 05/18/2020 13.3  12.0 - 16.0 g/dL Final    Hematocrit 05/18/2020 41.3  37.0 - 48.5 % Final    Mean Corpuscular Volume 05/18/2020 96  82 - 98 fL Final    Mean Corpuscular Hemoglobin 05/18/2020 30.9  27.0 - 31.0 pg Final    Mean Corpuscular Hemoglobin Conc 05/18/2020 32.2  32.0 - 36.0 g/dL Final    RDW 05/18/2020 12.9  11.5 - 14.5 % Final    Platelets 05/18/2020 333  150 - 350 K/uL Final    MPV 05/18/2020 9.7  9.2 - 12.9 fL Final    Immature Granulocytes 05/18/2020 1.3* 0.0 - 0.5 % Final    Gran # (ANC) 05/18/2020 5.9  1.8 - 7.7 K/uL Final    Immature Grans (Abs) 05/18/2020 0.12* 0.00 - 0.04 K/uL Final    Lymph # 05/18/2020 1.7  1.0 - 4.8 K/uL Final    Mono # 05/18/2020 0.8  0.3 - 1.0 K/uL Final    Eos # 05/18/2020 0.4  0.0 - 0.5 K/uL Final    Baso # 05/18/2020 0.05  0.00 - 0.20 K/uL Final    nRBC 05/18/2020 0  0 /100 WBC Final    Gran% 05/18/2020 65.4  38.0 - 73.0 % Final    Lymph% 05/18/2020 19.1  18.0 - 48.0 % Final    Mono% 05/18/2020 8.8  4.0 - 15.0 % Final    Eosinophil% 05/18/2020 4.8  0.0 - 8.0 % Final    Basophil% 05/18/2020 0.6  0.0 - 1.9 % Final    Differential Method 05/18/2020 Automated   Final    Sodium 05/18/2020 135* 136 - 145 mmol/L Final    Potassium 05/18/2020 4.0  3.5 - 5.1 mmol/L Final    Chloride 05/18/2020 100  95 - 110  mmol/L Final    CO2 05/18/2020 26  23 - 29 mmol/L Final    Glucose 05/18/2020 98  70 - 110 mg/dL Final    BUN, Bld 05/18/2020 14  8 - 23 mg/dL Final    Creatinine 05/18/2020 1.0  0.5 - 1.4 mg/dL Final    Calcium 05/18/2020 9.5  8.7 - 10.5 mg/dL Final    Total Protein 05/18/2020 7.2  6.0 - 8.4 g/dL Final    Albumin 05/18/2020 3.9  3.5 - 5.2 g/dL Final    Total Bilirubin 05/18/2020 0.8  0.1 - 1.0 mg/dL Final    Alkaline Phosphatase 05/18/2020 58  55 - 135 U/L Final    AST 05/18/2020 26  10 - 40 U/L Final    ALT 05/18/2020 18  10 - 44 U/L Final    Anion Gap 05/18/2020 9  8 - 16 mmol/L Final    eGFR if African American 05/18/2020 59.4* >60 mL/min/1.73 m^2 Final    eGFR if non African American 05/18/2020 51.5* >60 mL/min/1.73 m^2 Final    Cholesterol 05/18/2020 226* 120 - 199 mg/dL Final    Triglycerides 05/18/2020 141  30 - 150 mg/dL Final    HDL 05/18/2020 72  40 - 75 mg/dL Final    LDL Cholesterol 05/18/2020 125.8  63.0 - 159.0 mg/dL Final    Hdl/Cholesterol Ratio 05/18/2020 31.9  20.0 - 50.0 % Final    Total Cholesterol/HDL Ratio 05/18/2020 3.1  2.0 - 5.0 Final    Non-HDL Cholesterol 05/18/2020 154  mg/dL Final    Calcium 05/18/2020 9.5  8.7 - 10.5 mg/dL Final   Admission on 05/12/2020, Discharged on 05/13/2020   Component Date Value Ref Range Status    WBC 05/12/2020 8.05  3.90 - 12.70 K/uL Final    RBC 05/12/2020 4.02  4.00 - 5.40 M/uL Final    Hemoglobin 05/12/2020 12.8  12.0 - 16.0 g/dL Final    Hematocrit 05/12/2020 38.9  37.0 - 48.5 % Final    Mean Corpuscular Volume 05/12/2020 97  82 - 98 fL Final    Mean Corpuscular Hemoglobin 05/12/2020 31.8* 27.0 - 31.0 pg Final    Mean Corpuscular Hemoglobin Conc 05/12/2020 32.9  32.0 - 36.0 g/dL Final    RDW 05/12/2020 12.5  11.5 - 14.5 % Final    Platelets 05/12/2020 283  150 - 350 K/uL Final    MPV 05/12/2020 9.5  9.2 - 12.9 fL Final    Immature Granulocytes 05/12/2020 0.5  0.0 - 0.5 % Final    Gran # (ANC) 05/12/2020 6.3  1.8 - 7.7  K/uL Final    Immature Grans (Abs) 05/12/2020 0.04  0.00 - 0.04 K/uL Final    Lymph # 05/12/2020 1.0  1.0 - 4.8 K/uL Final    Mono # 05/12/2020 0.6  0.3 - 1.0 K/uL Final    Eos # 05/12/2020 0.1  0.0 - 0.5 K/uL Final    Baso # 05/12/2020 0.04  0.00 - 0.20 K/uL Final    nRBC 05/12/2020 0  0 /100 WBC Final    Gran% 05/12/2020 78.1* 38.0 - 73.0 % Final    Lymph% 05/12/2020 12.3* 18.0 - 48.0 % Final    Mono% 05/12/2020 7.5  4.0 - 15.0 % Final    Eosinophil% 05/12/2020 1.1  0.0 - 8.0 % Final    Basophil% 05/12/2020 0.5  0.0 - 1.9 % Final    Differential Method 05/12/2020 Automated   Final    Sodium 05/12/2020 139  136 - 145 mmol/L Final    Potassium 05/12/2020 3.7  3.5 - 5.1 mmol/L Final    Chloride 05/12/2020 103  95 - 110 mmol/L Final    CO2 05/12/2020 28  23 - 29 mmol/L Final    Glucose 05/12/2020 140* 70 - 110 mg/dL Final    BUN, Bld 05/12/2020 19  8 - 23 mg/dL Final    Creatinine 05/12/2020 0.8  0.5 - 1.4 mg/dL Final    Calcium 05/12/2020 9.2  8.7 - 10.5 mg/dL Final    Total Protein 05/12/2020 6.9  6.0 - 8.4 g/dL Final    Albumin 05/12/2020 3.7  3.5 - 5.2 g/dL Final    Total Bilirubin 05/12/2020 0.8  0.1 - 1.0 mg/dL Final    Alkaline Phosphatase 05/12/2020 61  55 - 135 U/L Final    AST 05/12/2020 25  10 - 40 U/L Final    ALT 05/12/2020 16  10 - 44 U/L Final    Anion Gap 05/12/2020 8  8 - 16 mmol/L Final    eGFR if African American 05/12/2020 >60.0  >60 mL/min/1.73 m^2 Final    eGFR if non African American 05/12/2020 >60.0  >60 mL/min/1.73 m^2 Final    Troponin I 05/12/2020 <0.030  <=0.040 ng/mL Final    BNP 05/12/2020 48  0 - 99 pg/mL Final    PT 05/12/2020 13.4  10.6 - 14.8 sec Final    INR 05/12/2020 1.1   Final    Lactate (Lactic Acid) 05/12/2020 1.1  0.5 - 1.9 mmol/L Final    SARS-CoV-2 RNA, Amplification, Qual 05/12/2020 Negative  Negative Final    Procalcitonin 05/12/2020 <0.05  0.00 - 0.50 ng/mL Final    CPK 05/12/2020 43  20 - 180 U/L Final    LD 05/12/2020 195  110 - 260  U/L Final    CRP 05/12/2020 0.04  <0.76 mg/dL Final    Ferritin 05/12/2020 26  20.0 - 300.0 ng/mL Final    Magnesium 05/13/2020 2.0  1.6 - 2.6 mg/dL Final    Phosphorus 05/13/2020 2.9  2.7 - 4.5 mg/dL Final    Influenza A, Molecular 05/13/2020 Negative  Negative Final    Influenza B, Molecular 05/13/2020 Negative  Negative Final    Flu A & B Source 05/13/2020 Nasal swab   Final    POC PH 05/12/2020 7.440  7.35 - 7.45 Final    POC PCO2 05/12/2020 40.2  35 - 45 mmHg Final    POC PO2 05/12/2020 41* 80 - 100 mmHg Final    POC HCO3 05/12/2020 27.3  24 - 28 mmol/L Final    POC BE 05/12/2020 3  -2 to 2 mmol/L Final    POC SATURATED O2 05/12/2020 79* 95 - 100 % Final    POC TCO2 05/12/2020 29* 23 - 27 mmol/L Final    Sample 05/12/2020 ARTERIAL   Final    Site 05/12/2020 LR   Final    Allens Test 05/12/2020 Pass   Final    DelSys 05/12/2020 Room Air   Final    POC PH 05/12/2020 7.487* 7.35 - 7.45 Final    POC PCO2 05/12/2020 35.1  35 - 45 mmHg Final    POC PO2 05/12/2020 102* 80 - 100 mmHg Final    POC HCO3 05/12/2020 26.6  24 - 28 mmol/L Final    POC BE 05/12/2020 3  -2 to 2 mmol/L Final    POC SATURATED O2 05/12/2020 98  95 - 100 % Final    POC TCO2 05/12/2020 28* 23 - 27 mmol/L Final    Sample 05/12/2020 ARTERIAL   Final    Site 05/12/2020 RR   Final    Allens Test 05/12/2020 Pass   Final    DelSys 05/12/2020 Room Air   Final    POC Glucose 05/12/2020 175* 70 - 110 Final    Sodium 05/13/2020 137  136 - 145 mmol/L Final    Potassium 05/13/2020 3.8  3.5 - 5.1 mmol/L Final    Chloride 05/13/2020 100  95 - 110 mmol/L Final    CO2 05/13/2020 24  23 - 29 mmol/L Final    Glucose 05/13/2020 151* 70 - 110 mg/dL Final    BUN, Bld 05/13/2020 28* 8 - 23 mg/dL Final    Creatinine 05/13/2020 1.1  0.5 - 1.4 mg/dL Final    Calcium 05/13/2020 9.3  8.7 - 10.5 mg/dL Final    Total Protein 05/13/2020 6.7  6.0 - 8.4 g/dL Final    Albumin 05/13/2020 3.5  3.5 - 5.2 g/dL Final    Total Bilirubin  05/13/2020 0.6  0.1 - 1.0 mg/dL Final    Alkaline Phosphatase 05/13/2020 57  55 - 135 U/L Final    AST 05/13/2020 25  10 - 40 U/L Final    ALT 05/13/2020 16  10 - 44 U/L Final    Anion Gap 05/13/2020 13  8 - 16 mmol/L Final    eGFR if African American 05/13/2020 52.9* >60 mL/min/1.73 m^2 Final    eGFR if non African American 05/13/2020 45.9* >60 mL/min/1.73 m^2 Final    WBC 05/13/2020 10.47  3.90 - 12.70 K/uL Final    RBC 05/13/2020 3.69* 4.00 - 5.40 M/uL Final    Hemoglobin 05/13/2020 11.9* 12.0 - 16.0 g/dL Final    Hematocrit 05/13/2020 35.7* 37.0 - 48.5 % Final    Mean Corpuscular Volume 05/13/2020 97  82 - 98 fL Final    Mean Corpuscular Hemoglobin 05/13/2020 32.2* 27.0 - 31.0 pg Final    Mean Corpuscular Hemoglobin Conc 05/13/2020 33.3  32.0 - 36.0 g/dL Final    RDW 05/13/2020 12.6  11.5 - 14.5 % Final    Platelets 05/13/2020 280  150 - 350 K/uL Final    MPV 05/13/2020 10.0  9.2 - 12.9 fL Final    Immature Granulocytes 05/13/2020 0.7* 0.0 - 0.5 % Final    Gran # (ANC) 05/13/2020 9.4* 1.8 - 7.7 K/uL Final    Immature Grans (Abs) 05/13/2020 0.07* 0.00 - 0.04 K/uL Final    Lymph # 05/13/2020 0.9* 1.0 - 4.8 K/uL Final    Mono # 05/13/2020 0.1* 0.3 - 1.0 K/uL Final    Eos # 05/13/2020 0.0  0.0 - 0.5 K/uL Final    Baso # 05/13/2020 0.01  0.00 - 0.20 K/uL Final    nRBC 05/13/2020 0  0 /100 WBC Final    Gran% 05/13/2020 89.9* 38.0 - 73.0 % Final    Lymph% 05/13/2020 8.1* 18.0 - 48.0 % Final    Mono% 05/13/2020 1.2* 4.0 - 15.0 % Final    Eosinophil% 05/13/2020 0.0  0.0 - 8.0 % Final    Basophil% 05/13/2020 0.1  0.0 - 1.9 % Final    Differential Method 05/13/2020 Automated   Final    POC Glucose 05/13/2020 130* 70 - 110 Final    POC Glucose 05/13/2020 147* 70 - 110 Final    Adenovirus 05/13/2020 Not Detected  Not Detected Final    Coronavirus HKU1, Common Cold Virus 05/13/2020 Not Detected  Not Detected Final    Coronavirus NL63, Common Cold Virus 05/13/2020 Not Detected  Not Detected  Final    Coronavirus 229E, Common Cold Virus 05/13/2020 Not Detected  Not Detected Final    Coronavirus OC43, Common Cold Virus 05/13/2020 Not Detected  Not Detected Final    Human Metapneumovirus 05/13/2020 Not Detected  Not Detected Final    Human Rhinovirus/Enterovirus 05/13/2020 Not Detected  Not Detected Final    Influenza A 05/13/2020 Not Detected  Not Detected Final    Influenza A/H1 05/13/2020 Not Detected  Not Detected Final    Influenza A/H1-2009 05/13/2020 Not Detected  Not Detected Final    Influenza A/H3 05/13/2020 Not Detected  Not Detected Final    Influenza B 05/13/2020 Not Detected  Not Detected Final    Parainfluenza Virus 1 05/13/2020 Not Detected  Not Detected Final    Parainfluenza Virus 2 05/13/2020 Not Detected  Not Detected Final    Parainfluenza Virus 3 05/13/2020 Not Detected  Not Detected Final    Parainfluenza Virus 4 05/13/2020 Not Detected  Not Detected Final    Respiratory Syncytial Virus 05/13/2020 Not Detected  Not Detected Final    Bordetella pertussis (ptxP) 05/13/2020 Not Detected  Not Detected Final    Chlamydia pneumoniae 05/13/2020 Not Detected  Not Detected Final    Mycoplasma pneumoniae 05/13/2020 Not Detected  Not Detected Final   Admission on 01/05/2020, Discharged on 01/05/2020   Component Date Value Ref Range Status    WBC 01/05/2020 8.84  3.90 - 12.70 K/uL Final    RBC 01/05/2020 3.83* 4.00 - 5.40 M/uL Final    Hemoglobin 01/05/2020 11.9* 12.0 - 16.0 g/dL Final    Hematocrit 01/05/2020 35.9* 37.0 - 48.5 % Final    Mean Corpuscular Volume 01/05/2020 94  82 - 98 fL Final    Mean Corpuscular Hemoglobin 01/05/2020 31.1* 27.0 - 31.0 pg Final    Mean Corpuscular Hemoglobin Conc 01/05/2020 33.1  32.0 - 36.0 g/dL Final    RDW 01/05/2020 13.7  11.5 - 14.5 % Final    Platelets 01/05/2020 256  150 - 350 K/uL Final    MPV 01/05/2020 9.7  9.2 - 12.9 fL Final    Immature Granulocytes 01/05/2020 1.0* 0.0 - 0.5 % Final    Gran # (ANC) 01/05/2020 7.3  1.8 -  7.7 K/uL Final    Immature Grans (Abs) 01/05/2020 0.09* 0.00 - 0.04 K/uL Final    Lymph # 01/05/2020 1.0  1.0 - 4.8 K/uL Final    Mono # 01/05/2020 0.5  0.3 - 1.0 K/uL Final    Eos # 01/05/2020 0.0  0.0 - 0.5 K/uL Final    Baso # 01/05/2020 0.01  0.00 - 0.20 K/uL Final    nRBC 01/05/2020 0  0 /100 WBC Final    Gran% 01/05/2020 82.3* 38.0 - 73.0 % Final    Lymph% 01/05/2020 11.2* 18.0 - 48.0 % Final    Mono% 01/05/2020 5.3  4.0 - 15.0 % Final    Eosinophil% 01/05/2020 0.1  0.0 - 8.0 % Final    Basophil% 01/05/2020 0.1  0.0 - 1.9 % Final    Differential Method 01/05/2020 Automated   Final    Sodium 01/05/2020 139  136 - 145 mmol/L Final    Potassium 01/05/2020 3.5  3.5 - 5.1 mmol/L Final    Chloride 01/05/2020 101  95 - 110 mmol/L Final    CO2 01/05/2020 26  23 - 29 mmol/L Final    Glucose 01/05/2020 120* 70 - 110 mg/dL Final    BUN, Bld 01/05/2020 35* 8 - 23 mg/dL Final    Creatinine 01/05/2020 1.1  0.5 - 1.4 mg/dL Final    Calcium 01/05/2020 8.8  8.7 - 10.5 mg/dL Final    Total Protein 01/05/2020 6.8  6.0 - 8.4 g/dL Final    Albumin 01/05/2020 3.7  3.5 - 5.2 g/dL Final    Total Bilirubin 01/05/2020 0.6  0.1 - 1.0 mg/dL Final    Alkaline Phosphatase 01/05/2020 57  55 - 135 U/L Final    AST 01/05/2020 25  10 - 40 U/L Final    ALT 01/05/2020 18  10 - 44 U/L Final    Anion Gap 01/05/2020 12  8 - 16 mmol/L Final    eGFR if African American 01/05/2020 52.9* >60 mL/min/1.73 m^2 Final    eGFR if non African American 01/05/2020 45.9* >60 mL/min/1.73 m^2 Final    Troponin I 01/05/2020 <0.030  <=0.040 ng/mL Final    BNP 01/05/2020 48  0 - 99 pg/mL Final    PT 01/05/2020 14.5  10.6 - 14.8 sec Final    INR 01/05/2020 1.2   Final    Magnesium 01/05/2020 2.2  1.6 - 2.6 mg/dL Final   Admission on 01/04/2020, Discharged on 01/04/2020   Component Date Value Ref Range Status    WBC 01/04/2020 10.52  3.90 - 12.70 K/uL Final    RBC 01/04/2020 4.36  4.00 - 5.40 M/uL Final    Hemoglobin 01/04/2020 13.7   12.0 - 16.0 g/dL Final    Hematocrit 01/04/2020 41.4  37.0 - 48.5 % Final    Mean Corpuscular Volume 01/04/2020 95  82 - 98 fL Final    Mean Corpuscular Hemoglobin 01/04/2020 31.4* 27.0 - 31.0 pg Final    Mean Corpuscular Hemoglobin Conc 01/04/2020 33.1  32.0 - 36.0 g/dL Final    RDW 01/04/2020 14.0  11.5 - 14.5 % Final    Platelets 01/04/2020 279  150 - 350 K/uL Final    MPV 01/04/2020 9.6  9.2 - 12.9 fL Final    Immature Granulocytes 01/04/2020 1.1* 0.0 - 0.5 % Final    Gran # (ANC) 01/04/2020 7.4  1.8 - 7.7 K/uL Final    Immature Grans (Abs) 01/04/2020 0.12* 0.00 - 0.04 K/uL Final    Lymph # 01/04/2020 2.2  1.0 - 4.8 K/uL Final    Mono # 01/04/2020 0.7  0.3 - 1.0 K/uL Final    Eos # 01/04/2020 0.1  0.0 - 0.5 K/uL Final    Baso # 01/04/2020 0.03  0.00 - 0.20 K/uL Final    nRBC 01/04/2020 0  0 /100 WBC Final    Gran% 01/04/2020 70.0  38.0 - 73.0 % Final    Lymph% 01/04/2020 21.0  18.0 - 48.0 % Final    Mono% 01/04/2020 6.8  4.0 - 15.0 % Final    Eosinophil% 01/04/2020 0.8  0.0 - 8.0 % Final    Basophil% 01/04/2020 0.3  0.0 - 1.9 % Final    Differential Method 01/04/2020 Automated   Final    Sodium 01/04/2020 140  136 - 145 mmol/L Final    Potassium 01/04/2020 4.0  3.5 - 5.1 mmol/L Final    Chloride 01/04/2020 102  95 - 110 mmol/L Final    CO2 01/04/2020 26  23 - 29 mmol/L Final    Glucose 01/04/2020 87  70 - 110 mg/dL Final    BUN, Bld 01/04/2020 36* 8 - 23 mg/dL Final    Creatinine 01/04/2020 1.0  0.5 - 1.4 mg/dL Final    Calcium 01/04/2020 9.7  8.7 - 10.5 mg/dL Final    Total Protein 01/04/2020 7.0  6.0 - 8.4 g/dL Final    Albumin 01/04/2020 3.9  3.5 - 5.2 g/dL Final    Total Bilirubin 01/04/2020 1.0  0.1 - 1.0 mg/dL Final    Alkaline Phosphatase 01/04/2020 59  55 - 135 U/L Final    AST 01/04/2020 32  10 - 40 U/L Final    ALT 01/04/2020 19  10 - 44 U/L Final    Anion Gap 01/04/2020 12  8 - 16 mmol/L Final    eGFR if African American 01/04/2020 59.4* >60 mL/min/1.73 m^2 Final     eGFR if non African American 01/04/2020 51.5* >60 mL/min/1.73 m^2 Final    Troponin I 01/04/2020 <0.030  <=0.040 ng/mL Final    BNP 01/04/2020 45  0 - 99 pg/mL Final    Troponin I 01/04/2020 <0.030  <=0.040 ng/mL Final    CRP 01/04/2020 0.15  0.00 - 0.75 mg/dL Final    Sed Rate 01/04/2020 10  0 - 20 mm/Hr Final       Past Medical History:   Diagnosis Date    Arthritis     Chronic kidney disease     Hearing loss, conductive, bilateral     High cholesterol     History of shingles     vaginal    History of stent insertion of renal artery 2010    Hypertension     Idiopathic pulmonary fibrosis     Kidney artery constriction     has renal artery stent    Lumbar radiculopathy     Osteoporosis     Pharyngitis, chronic     PONV (postoperative nausea and vomiting)     PVD (peripheral vascular disease)     CHASTITY (renal artery stenosis) 11/26/2018    Stroke     of eye vasculature    UTI (urinary tract infection)     Recurrent     Past Surgical History:   Procedure Laterality Date    ANTERIOR CERVICAL DISCECTOMY W/ FUSION      APPENDECTOMY      BLADDER SUSPENSION      CERVICAL SPINE SURGERY      COLONOSCOPY      COSMETIC SURGERY      EYE SURGERY      HYSTERECTOMY       Family History   Problem Relation Age of Onset    Arthritis Mother     Hearing loss Mother     Heart disease Mother     Hypertension Mother     Kidney disease Mother     Stroke Mother     Heart disease Father        Marital Status: Single  Alcohol History:  reports that she does not drink alcohol.  Tobacco History:  reports that she has quit smoking. Her smoking use included cigarettes. She smoked 1.00 pack per day. She has never used smokeless tobacco.  Drug History:  reports that she does not use drugs.    Review of patient's allergies indicates:   Allergen Reactions    Adhesive     Arb-angiotensin receptor antagonist     Atacand [candesartan]     Bactrim [sulfamethoxazole-trimethoprim]     Codeine Hives     Penicillins Hives    Sulfa (sulfonamide antibiotics)     Trental [pentoxifylline]        Current Outpatient Medications:     albuterol (PROVENTIL) 2.5 mg /3 mL (0.083 %) nebulizer solution, Take 3 mLs (2.5 mg total) by nebulization every 4 (four) hours as needed (shortness of breath). Rescue, Disp: 30 each, Rfl: 0    alendronate (FOSAMAX) 70 MG tablet, Take 1 tablet (70 mg total) by mouth every 7 days., Disp: 12 tablet, Rfl: 1    brimonidine 0.1% (ALPHAGAN P) 0.1 % Drop, Place 1 drop into both eyes 3 (three) times daily., Disp: 15 mL, Rfl: 1    calcitRIOL (ROCALTROL) 0.25 MCG Cap, TAKE 1 CAPSULE EVERY DAY (Patient taking differently: Take 0.25 mcg by mouth once daily. ), Disp: 90 capsule, Rfl: 1    cholecalciferol, vitamin D3, 50,000 unit capsule, Take 1 capsule (50,000 Units total) by mouth every 7 days., Disp: 12 capsule, Rfl: 0    cycloSPORINE (RESTASIS) 0.05 % ophthalmic emulsion, Place 1 drop into both eyes 2 (two) times daily., Disp: , Rfl:     diltiaZEM (CARDIZEM CD) 360 MG 24 hr capsule, Take 1 capsule (360 mg total) by mouth once daily., Disp: 90 capsule, Rfl: 1    donepezil (ARICEPT) 10 MG tablet, Take 1 tablet (10 mg total) by mouth once daily., Disp: 90 tablet, Rfl: 1    ibuprofen (ADVIL,MOTRIN) 800 MG tablet, Take 800 mg by mouth daily as needed for Pain., Disp: , Rfl:     memantine (NAMENDA) 10 MG Tab, Take 1 tablet (10 mg total) by mouth 2 (two) times daily., Disp: 180 tablet, Rfl: 1    trimethoprim (TRIMPEX) 100 mg Tab, Take 1 tablet (100 mg total) by mouth every evening., Disp: 42 tablet, Rfl: 6    Review of Systems   Constitutional: Positive for appetite change (reduced) and unexpected weight change (weigh loss). Negative for chills, diaphoresis, fatigue and fever.   HENT: Negative for congestion, ear pain, hearing loss, nosebleeds, postnasal drip, sinus pressure, sinus pain, sneezing, sore throat, tinnitus, trouble swallowing and voice change.    Eyes: Negative for photophobia, pain,  "itching and visual disturbance.   Respiratory: Positive for cough and shortness of breath. Negative for apnea, chest tightness, wheezing and stridor.    Cardiovascular: Negative for chest pain, palpitations and leg swelling.   Gastrointestinal: Negative for abdominal distention, abdominal pain, blood in stool, constipation, diarrhea, nausea and vomiting.        Wearing depends-regurgitates food at times but mostly liquids-chokes at times-belching all the time post prandial   Endocrine: Negative for cold intolerance, heat intolerance, polydipsia and polyuria.   Genitourinary: Negative for difficulty urinating, dyspareunia, dysuria, flank pain, frequency, hematuria, menstrual problem, pelvic pain, urgency, vaginal discharge and vaginal pain.        Dribbling with urgency   Musculoskeletal: Positive for back pain (chronic). Negative for arthralgias, joint swelling, myalgias, neck pain and neck stiffness.   Skin: Negative for pallor.   Allergic/Immunologic: Negative for environmental allergies and food allergies.   Neurological: Positive for speech difficulty (slurred at times) and headaches. Negative for dizziness, tremors, weakness, light-headedness and numbness.   Hematological: Does not bruise/bleed easily.   Psychiatric/Behavioral: Positive for agitation, confusion, decreased concentration, dysphoric mood, hallucinations (audtory) and sleep disturbance. Negative for suicidal ideas. The patient is nervous/anxious.         Objective:      Vitals:    05/20/20 0927 05/20/20 1006   BP: (!) 150/74 136/78   Pulse: 66    Resp: 18    Temp: 97.5 °F (36.4 °C)    SpO2: 97%    Weight: 54.4 kg (120 lb)    Height: 4' 10" (1.473 m)      Physical Exam   Constitutional: She is oriented to person, place, and time. She appears well-developed. She is cooperative. No distress.   Evidence of weight loss   HENT:   Head: Normocephalic and atraumatic.   Mouth/Throat: Uvula is midline and mucous membranes are normal.   Eyes: Pupils are " equal, round, and reactive to light. Conjunctivae and EOM are normal. Right eye exhibits no discharge. Left eye exhibits no discharge. No scleral icterus. Right pupil is round and reactive. Left pupil is round and reactive.   Neck: Trachea normal and normal range of motion. Neck supple. No JVD present. Carotid bruit is not present. No thyromegaly present.   Cardiovascular: Normal rate, regular rhythm and intact distal pulses. Exam reveals no gallop and no friction rub.   No murmur heard.  Pulses:       Dorsalis pedis pulses are 1+ on the right side, and 1+ on the left side.        Posterior tibial pulses are 1+ on the right side, and 1+ on the left side.   Pulmonary/Chest: Effort normal. No respiratory distress. She has no wheezes. She has no rales.   Fine crackles   Abdominal: Soft. Bowel sounds are normal. She exhibits no distension and no mass. There is no tenderness. There is no guarding. No hernia.   Musculoskeletal: Normal range of motion. She exhibits no edema.   Neurological: She is alert and oriented to person, place, and time. She has normal strength.   Skin: Skin is warm and dry. No lesion and no rash noted. No cyanosis. Nails show no clubbing.   Psychiatric: She has a normal mood and affect. Her speech is normal and behavior is normal.   Nursing note and vitals reviewed.      Assessment:       1. Memory loss    2. PAD (peripheral artery disease)    3. Leg pain, lateral, left    4. Pulmonary fibrosis    5. Pure hypercholesterolemia    6. Age-related osteoporosis without current pathological fracture    7. Frequent headaches    8. Chronic mixed headache syndrome    9. Essential hypertension         Plan:       Memory loss        -    Continue rx    PAD (peripheral artery disease)        -     asx at this point    Leg pain, lateral, left        -     No treatment at this time except local applications    Pulmonary fibrosis        -     Per pulmonary    Pure hypercholesterolemia        -     No treatment  recommended         -         Age-related osteoporosis without current pathological fracture        -     Continue present therapy      Follow up in about 3 months (around 8/20/2020) for FOLLOW-UP STATUS, FOLLOW UP LABS, FOLLOW UP MEDICATIONS.

## 2020-05-20 ENCOUNTER — OFFICE VISIT (OUTPATIENT)
Dept: FAMILY MEDICINE | Facility: CLINIC | Age: 85
End: 2020-05-20
Payer: MEDICARE

## 2020-05-20 VITALS
WEIGHT: 120 LBS | BODY MASS INDEX: 25.19 KG/M2 | SYSTOLIC BLOOD PRESSURE: 136 MMHG | DIASTOLIC BLOOD PRESSURE: 78 MMHG | HEIGHT: 58 IN | TEMPERATURE: 98 F | RESPIRATION RATE: 18 BRPM | OXYGEN SATURATION: 97 % | HEART RATE: 66 BPM

## 2020-05-20 DIAGNOSIS — I73.9 PAD (PERIPHERAL ARTERY DISEASE): ICD-10-CM

## 2020-05-20 DIAGNOSIS — M81.0 AGE-RELATED OSTEOPOROSIS WITHOUT CURRENT PATHOLOGICAL FRACTURE: ICD-10-CM

## 2020-05-20 DIAGNOSIS — G44.89 CHRONIC MIXED HEADACHE SYNDROME: ICD-10-CM

## 2020-05-20 DIAGNOSIS — E78.00 PURE HYPERCHOLESTEROLEMIA: ICD-10-CM

## 2020-05-20 DIAGNOSIS — R51.9 FREQUENT HEADACHES: ICD-10-CM

## 2020-05-20 DIAGNOSIS — M79.605 LEG PAIN, LATERAL, LEFT: ICD-10-CM

## 2020-05-20 DIAGNOSIS — R41.3 MEMORY LOSS: Primary | ICD-10-CM

## 2020-05-20 DIAGNOSIS — J84.10 PULMONARY FIBROSIS: ICD-10-CM

## 2020-05-20 DIAGNOSIS — I10 ESSENTIAL HYPERTENSION: ICD-10-CM

## 2020-05-20 PROCEDURE — 99214 PR OFFICE/OUTPT VISIT, EST, LEVL IV, 30-39 MIN: ICD-10-PCS | Mod: S$GLB,,, | Performed by: INTERNAL MEDICINE

## 2020-05-20 PROCEDURE — 1159F PR MEDICATION LIST DOCUMENTED IN MEDICAL RECORD: ICD-10-PCS | Mod: S$GLB,,, | Performed by: INTERNAL MEDICINE

## 2020-05-20 PROCEDURE — 99214 OFFICE O/P EST MOD 30 MIN: CPT | Mod: S$GLB,,, | Performed by: INTERNAL MEDICINE

## 2020-05-20 PROCEDURE — 3075F PR MOST RECENT SYSTOLIC BLOOD PRESS GE 130-139MM HG: ICD-10-PCS | Mod: S$GLB,,, | Performed by: INTERNAL MEDICINE

## 2020-05-20 PROCEDURE — 1159F MED LIST DOCD IN RCRD: CPT | Mod: S$GLB,,, | Performed by: INTERNAL MEDICINE

## 2020-05-20 PROCEDURE — 1111F PR DISCHARGE MEDS RECONCILED W/ CURRENT OUTPATIENT MED LIST: ICD-10-PCS | Mod: S$GLB,,, | Performed by: INTERNAL MEDICINE

## 2020-05-20 PROCEDURE — 3075F SYST BP GE 130 - 139MM HG: CPT | Mod: S$GLB,,, | Performed by: INTERNAL MEDICINE

## 2020-05-20 PROCEDURE — 3078F PR MOST RECENT DIASTOLIC BLOOD PRESSURE < 80 MM HG: ICD-10-PCS | Mod: S$GLB,,, | Performed by: INTERNAL MEDICINE

## 2020-05-20 PROCEDURE — 1101F PR PT FALLS ASSESS DOC 0-1 FALLS W/OUT INJ PAST YR: ICD-10-PCS | Mod: S$GLB,,, | Performed by: INTERNAL MEDICINE

## 2020-05-20 PROCEDURE — 1126F PR PAIN SEVERITY QUANTIFIED, NO PAIN PRESENT: ICD-10-PCS | Mod: S$GLB,,, | Performed by: INTERNAL MEDICINE

## 2020-05-20 PROCEDURE — 1126F AMNT PAIN NOTED NONE PRSNT: CPT | Mod: S$GLB,,, | Performed by: INTERNAL MEDICINE

## 2020-05-20 PROCEDURE — 1111F DSCHRG MED/CURRENT MED MERGE: CPT | Mod: S$GLB,,, | Performed by: INTERNAL MEDICINE

## 2020-05-20 PROCEDURE — 1101F PT FALLS ASSESS-DOCD LE1/YR: CPT | Mod: S$GLB,,, | Performed by: INTERNAL MEDICINE

## 2020-05-20 PROCEDURE — 3078F DIAST BP <80 MM HG: CPT | Mod: S$GLB,,, | Performed by: INTERNAL MEDICINE

## 2020-05-22 ENCOUNTER — HOSPITAL ENCOUNTER (OUTPATIENT)
Dept: RADIOLOGY | Facility: HOSPITAL | Age: 85
Discharge: HOME | End: 2020-05-22
Attending: INTERNAL MEDICINE
Payer: MEDICARE

## 2020-05-22 DIAGNOSIS — G44.89 CHRONIC MIXED HEADACHE SYNDROME: ICD-10-CM

## 2020-05-22 PROCEDURE — 70551 MRI BRAIN STEM W/O DYE: CPT | Mod: TC,PO

## 2020-05-27 DIAGNOSIS — M81.0 AGE-RELATED OSTEOPOROSIS WITHOUT CURRENT PATHOLOGICAL FRACTURE: ICD-10-CM

## 2020-05-27 DIAGNOSIS — R41.3 MEMORY LOSS: ICD-10-CM

## 2020-05-28 RX ORDER — ALENDRONATE SODIUM 70 MG/1
70 TABLET ORAL
Qty: 12 TABLET | Refills: 1 | Status: SHIPPED | OUTPATIENT
Start: 2020-05-28

## 2020-05-28 RX ORDER — DONEPEZIL HYDROCHLORIDE 10 MG/1
10 TABLET, FILM COATED ORAL DAILY
Qty: 90 TABLET | Refills: 1 | Status: SHIPPED | OUTPATIENT
Start: 2020-05-28

## 2020-05-28 RX ORDER — OMEPRAZOLE 40 MG/1
CAPSULE, DELAYED RELEASE ORAL
Qty: 90 CAPSULE | Refills: 1 | Status: SHIPPED | OUTPATIENT
Start: 2020-05-28

## 2020-05-28 RX ORDER — ATORVASTATIN CALCIUM 10 MG/1
TABLET, FILM COATED ORAL
Qty: 90 TABLET | Refills: 1 | Status: SHIPPED | OUTPATIENT
Start: 2020-05-28

## 2020-06-08 ENCOUNTER — EXTERNAL HOME HEALTH (OUTPATIENT)
Dept: HOME HEALTH SERVICES | Facility: HOSPITAL | Age: 85
End: 2020-06-08